# Patient Record
Sex: MALE | Race: WHITE | NOT HISPANIC OR LATINO | URBAN - METROPOLITAN AREA
[De-identification: names, ages, dates, MRNs, and addresses within clinical notes are randomized per-mention and may not be internally consistent; named-entity substitution may affect disease eponyms.]

---

## 2021-03-13 ENCOUNTER — INPATIENT (INPATIENT)
Facility: HOSPITAL | Age: 71
LOS: 5 days | Discharge: HOME HEALTH | End: 2021-03-19
Attending: FAMILY MEDICINE | Admitting: FAMILY MEDICINE
Payer: MEDICARE

## 2021-03-13 ENCOUNTER — EMERGENCY (EMERGENCY)
Facility: HOSPITAL | Age: 71
LOS: 0 days | Discharge: HOME | End: 2021-03-13
Attending: EMERGENCY MEDICINE
Payer: MEDICARE

## 2021-03-13 VITALS
DIASTOLIC BLOOD PRESSURE: 88 MMHG | SYSTOLIC BLOOD PRESSURE: 168 MMHG | RESPIRATION RATE: 20 BRPM | OXYGEN SATURATION: 98 % | HEART RATE: 90 BPM | TEMPERATURE: 98 F

## 2021-03-13 VITALS — HEIGHT: 68 IN | WEIGHT: 210.1 LBS

## 2021-03-13 VITALS
HEART RATE: 108 BPM | HEIGHT: 68 IN | SYSTOLIC BLOOD PRESSURE: 139 MMHG | OXYGEN SATURATION: 99 % | WEIGHT: 210.1 LBS | DIASTOLIC BLOOD PRESSURE: 78 MMHG | TEMPERATURE: 99 F | RESPIRATION RATE: 18 BRPM

## 2021-03-13 DIAGNOSIS — R33.9 RETENTION OF URINE, UNSPECIFIED: ICD-10-CM

## 2021-03-13 DIAGNOSIS — F32.9 MAJOR DEPRESSIVE DISORDER, SINGLE EPISODE, UNSPECIFIED: ICD-10-CM

## 2021-03-13 DIAGNOSIS — I10 ESSENTIAL (PRIMARY) HYPERTENSION: ICD-10-CM

## 2021-03-13 DIAGNOSIS — F41.9 ANXIETY DISORDER, UNSPECIFIED: ICD-10-CM

## 2021-03-13 DIAGNOSIS — Z98.890 OTHER SPECIFIED POSTPROCEDURAL STATES: Chronic | ICD-10-CM

## 2021-03-13 DIAGNOSIS — H26.9 UNSPECIFIED CATARACT: Chronic | ICD-10-CM

## 2021-03-13 DIAGNOSIS — Z02.9 ENCOUNTER FOR ADMINISTRATIVE EXAMINATIONS, UNSPECIFIED: ICD-10-CM

## 2021-03-13 DIAGNOSIS — M10.9 GOUT, UNSPECIFIED: ICD-10-CM

## 2021-03-13 DIAGNOSIS — C67.9 MALIGNANT NEOPLASM OF BLADDER, UNSPECIFIED: ICD-10-CM

## 2021-03-13 LAB
ALBUMIN SERPL ELPH-MCNC: 3.9 G/DL — SIGNIFICANT CHANGE UP (ref 3.5–5.2)
ALP SERPL-CCNC: 79 U/L — SIGNIFICANT CHANGE UP (ref 30–115)
ALT FLD-CCNC: 17 U/L — SIGNIFICANT CHANGE UP (ref 0–41)
ANION GAP SERPL CALC-SCNC: 13 MMOL/L — SIGNIFICANT CHANGE UP (ref 7–14)
APPEARANCE UR: ABNORMAL
APTT BLD: 29.4 SEC — SIGNIFICANT CHANGE UP (ref 27–39.2)
AST SERPL-CCNC: 42 U/L — HIGH (ref 0–41)
BACTERIA # UR AUTO: ABNORMAL /HPF
BASOPHILS # BLD AUTO: 0.04 K/UL — SIGNIFICANT CHANGE UP (ref 0–0.2)
BASOPHILS NFR BLD AUTO: 0.2 % — SIGNIFICANT CHANGE UP (ref 0–1)
BILIRUB SERPL-MCNC: 0.7 MG/DL — SIGNIFICANT CHANGE UP (ref 0.2–1.2)
BILIRUB UR-MCNC: ABNORMAL
BLD GP AB SCN SERPL QL: SIGNIFICANT CHANGE UP
BUN SERPL-MCNC: 35 MG/DL — HIGH (ref 10–20)
CALCIUM SERPL-MCNC: 9 MG/DL — SIGNIFICANT CHANGE UP (ref 8.5–10.1)
CHLORIDE SERPL-SCNC: 95 MMOL/L — LOW (ref 98–110)
CO2 SERPL-SCNC: 23 MMOL/L — SIGNIFICANT CHANGE UP (ref 17–32)
COD CRY URNS QL: NEGATIVE — SIGNIFICANT CHANGE UP
COLOR SPEC: ABNORMAL
CREAT SERPL-MCNC: 1.6 MG/DL — HIGH (ref 0.7–1.5)
DIFF PNL FLD: ABNORMAL
EOSINOPHIL # BLD AUTO: 0 K/UL — SIGNIFICANT CHANGE UP (ref 0–0.7)
EOSINOPHIL NFR BLD AUTO: 0 % — SIGNIFICANT CHANGE UP (ref 0–8)
EPI CELLS # UR: NEGATIVE — SIGNIFICANT CHANGE UP
GLUCOSE SERPL-MCNC: 110 MG/DL — HIGH (ref 70–99)
GLUCOSE UR QL: 250
GRAN CASTS # UR COMP ASSIST: NEGATIVE — SIGNIFICANT CHANGE UP
HCT VFR BLD CALC: 28.7 % — LOW (ref 42–52)
HGB BLD-MCNC: 10 G/DL — LOW (ref 14–18)
HYALINE CASTS # UR AUTO: NEGATIVE — SIGNIFICANT CHANGE UP
IMM GRANULOCYTES NFR BLD AUTO: 0.8 % — HIGH (ref 0.1–0.3)
INR BLD: 1.22 RATIO — SIGNIFICANT CHANGE UP (ref 0.65–1.3)
KETONES UR-MCNC: 80 — SIGNIFICANT CHANGE UP
LEUKOCYTE ESTERASE UR-ACNC: ABNORMAL
LYMPHOCYTES # BLD AUTO: 1.19 K/UL — LOW (ref 1.2–3.4)
LYMPHOCYTES # BLD AUTO: 6.5 % — LOW (ref 20.5–51.1)
MCHC RBC-ENTMCNC: 33.2 PG — HIGH (ref 27–31)
MCHC RBC-ENTMCNC: 34.8 G/DL — SIGNIFICANT CHANGE UP (ref 32–37)
MCV RBC AUTO: 95.3 FL — HIGH (ref 80–94)
MONOCYTES # BLD AUTO: 1.15 K/UL — HIGH (ref 0.1–0.6)
MONOCYTES NFR BLD AUTO: 6.3 % — SIGNIFICANT CHANGE UP (ref 1.7–9.3)
NEUTROPHILS # BLD AUTO: 15.75 K/UL — HIGH (ref 1.4–6.5)
NEUTROPHILS NFR BLD AUTO: 86.2 % — HIGH (ref 42.2–75.2)
NITRITE UR-MCNC: POSITIVE
NRBC # BLD: 0 /100 WBCS — SIGNIFICANT CHANGE UP (ref 0–0)
PH UR: 8.5 — SIGNIFICANT CHANGE UP (ref 5–8)
PLATELET # BLD AUTO: 286 K/UL — SIGNIFICANT CHANGE UP (ref 130–400)
POTASSIUM SERPL-MCNC: 4.6 MMOL/L — SIGNIFICANT CHANGE UP (ref 3.5–5)
POTASSIUM SERPL-SCNC: 4.6 MMOL/L — SIGNIFICANT CHANGE UP (ref 3.5–5)
PROT SERPL-MCNC: 6.2 G/DL — SIGNIFICANT CHANGE UP (ref 6–8)
PROT UR-MCNC: >=300
PROTHROM AB SERPL-ACNC: 14 SEC — HIGH (ref 9.95–12.87)
RAPID RVP RESULT: SIGNIFICANT CHANGE UP
RBC # BLD: 3.01 M/UL — LOW (ref 4.7–6.1)
RBC # FLD: 12.9 % — SIGNIFICANT CHANGE UP (ref 11.5–14.5)
RBC CASTS # UR COMP ASSIST: >50 /HPF
SARS-COV-2 RNA SPEC QL NAA+PROBE: SIGNIFICANT CHANGE UP
SODIUM SERPL-SCNC: 131 MMOL/L — LOW (ref 135–146)
SP GR SPEC: <=1.005 — SIGNIFICANT CHANGE UP (ref 1.01–1.03)
TRI-PHOS CRY UR QL COMP ASSIST: NEGATIVE — SIGNIFICANT CHANGE UP
URATE CRY FLD QL MICRO: NEGATIVE — SIGNIFICANT CHANGE UP
UROBILINOGEN FLD QL: 4 (ref 0.2–0.2)
WBC # BLD: 18.28 K/UL — HIGH (ref 4.8–10.8)
WBC # FLD AUTO: 18.28 K/UL — HIGH (ref 4.8–10.8)
WBC UR QL: ABNORMAL /HPF

## 2021-03-13 PROCEDURE — 99285 EMERGENCY DEPT VISIT HI MDM: CPT | Mod: CS

## 2021-03-13 PROCEDURE — 99283 EMERGENCY DEPT VISIT LOW MDM: CPT

## 2021-03-13 PROCEDURE — 99233 SBSQ HOSP IP/OBS HIGH 50: CPT

## 2021-03-13 PROCEDURE — 99221 1ST HOSP IP/OBS SF/LOW 40: CPT

## 2021-03-13 RX ORDER — BRIMONIDINE TARTRATE 2 MG/MG
1 SOLUTION/ DROPS OPHTHALMIC
Qty: 0 | Refills: 0 | DISCHARGE

## 2021-03-13 RX ORDER — ALPRAZOLAM 0.25 MG
1 TABLET ORAL AT BEDTIME
Refills: 0 | Status: DISCONTINUED | OUTPATIENT
Start: 2021-03-13 | End: 2021-03-13

## 2021-03-13 RX ORDER — CYCLOBENZAPRINE HYDROCHLORIDE 10 MG/1
10 TABLET, FILM COATED ORAL
Qty: 0 | Refills: 0 | DISCHARGE

## 2021-03-13 RX ORDER — LOSARTAN POTASSIUM 100 MG/1
100 TABLET, FILM COATED ORAL DAILY
Refills: 0 | Status: DISCONTINUED | OUTPATIENT
Start: 2021-03-13 | End: 2021-03-19

## 2021-03-13 RX ORDER — ALPRAZOLAM 0.25 MG
1 TABLET ORAL THREE TIMES A DAY
Refills: 0 | Status: DISCONTINUED | OUTPATIENT
Start: 2021-03-13 | End: 2021-03-14

## 2021-03-13 RX ORDER — BRIMONIDINE TARTRATE 2 MG/MG
1 SOLUTION/ DROPS OPHTHALMIC THREE TIMES A DAY
Refills: 0 | Status: DISCONTINUED | OUTPATIENT
Start: 2021-03-13 | End: 2021-03-19

## 2021-03-13 RX ORDER — CYCLOBENZAPRINE HYDROCHLORIDE 10 MG/1
10 TABLET, FILM COATED ORAL AT BEDTIME
Refills: 0 | Status: DISCONTINUED | OUTPATIENT
Start: 2021-03-13 | End: 2021-03-19

## 2021-03-13 RX ORDER — ONDANSETRON 8 MG/1
4 TABLET, FILM COATED ORAL EVERY 6 HOURS
Refills: 0 | Status: DISCONTINUED | OUTPATIENT
Start: 2021-03-13 | End: 2021-03-19

## 2021-03-13 RX ORDER — HYDROXYZINE HCL 10 MG
50 TABLET ORAL ONCE
Refills: 0 | Status: COMPLETED | OUTPATIENT
Start: 2021-03-13 | End: 2021-03-13

## 2021-03-13 RX ORDER — LATANOPROST 0.05 MG/ML
1 SOLUTION/ DROPS OPHTHALMIC; TOPICAL AT BEDTIME
Refills: 0 | Status: DISCONTINUED | OUTPATIENT
Start: 2021-03-13 | End: 2021-03-19

## 2021-03-13 RX ORDER — ALPRAZOLAM 0.25 MG
1 TABLET ORAL
Qty: 0 | Refills: 0 | DISCHARGE

## 2021-03-13 RX ORDER — CEFTRIAXONE 500 MG/1
1000 INJECTION, POWDER, FOR SOLUTION INTRAMUSCULAR; INTRAVENOUS ONCE
Refills: 0 | Status: COMPLETED | OUTPATIENT
Start: 2021-03-13 | End: 2021-03-13

## 2021-03-13 RX ORDER — LATANOPROST 0.05 MG/ML
1 SOLUTION/ DROPS OPHTHALMIC; TOPICAL
Qty: 0 | Refills: 0 | DISCHARGE

## 2021-03-13 RX ORDER — MORPHINE SULFATE 50 MG/1
4 CAPSULE, EXTENDED RELEASE ORAL ONCE
Refills: 0 | Status: DISCONTINUED | OUTPATIENT
Start: 2021-03-13 | End: 2021-03-13

## 2021-03-13 RX ORDER — FLUOXETINE HCL 10 MG
1 CAPSULE ORAL
Qty: 0 | Refills: 0 | DISCHARGE

## 2021-03-13 RX ORDER — AMLODIPINE BESYLATE 2.5 MG/1
1 TABLET ORAL
Qty: 0 | Refills: 0 | DISCHARGE

## 2021-03-13 RX ORDER — CEFTRIAXONE 500 MG/1
1000 INJECTION, POWDER, FOR SOLUTION INTRAMUSCULAR; INTRAVENOUS EVERY 24 HOURS
Refills: 0 | Status: COMPLETED | OUTPATIENT
Start: 2021-03-13 | End: 2021-03-16

## 2021-03-13 RX ORDER — SODIUM CHLORIDE 9 MG/ML
1000 INJECTION INTRAMUSCULAR; INTRAVENOUS; SUBCUTANEOUS
Refills: 0 | Status: DISCONTINUED | OUTPATIENT
Start: 2021-03-13 | End: 2021-03-19

## 2021-03-13 RX ORDER — AMLODIPINE BESYLATE 2.5 MG/1
10 TABLET ORAL DAILY
Refills: 0 | Status: DISCONTINUED | OUTPATIENT
Start: 2021-03-13 | End: 2021-03-19

## 2021-03-13 RX ORDER — LOSARTAN POTASSIUM 100 MG/1
1 TABLET, FILM COATED ORAL
Qty: 0 | Refills: 0 | DISCHARGE

## 2021-03-13 RX ORDER — CHLORHEXIDINE GLUCONATE 213 G/1000ML
1 SOLUTION TOPICAL
Refills: 0 | Status: DISCONTINUED | OUTPATIENT
Start: 2021-03-13 | End: 2021-03-19

## 2021-03-13 RX ORDER — SENNA PLUS 8.6 MG/1
2 TABLET ORAL AT BEDTIME
Refills: 0 | Status: DISCONTINUED | OUTPATIENT
Start: 2021-03-13 | End: 2021-03-19

## 2021-03-13 RX ORDER — ALLOPURINOL 300 MG
100 TABLET ORAL DAILY
Refills: 0 | Status: DISCONTINUED | OUTPATIENT
Start: 2021-03-13 | End: 2021-03-19

## 2021-03-13 RX ORDER — ALLOPURINOL 300 MG
1 TABLET ORAL
Qty: 0 | Refills: 0 | DISCHARGE

## 2021-03-13 RX ORDER — FLUOXETINE HCL 10 MG
20 CAPSULE ORAL DAILY
Refills: 0 | Status: DISCONTINUED | OUTPATIENT
Start: 2021-03-13 | End: 2021-03-19

## 2021-03-13 RX ADMIN — CEFTRIAXONE 100 MILLIGRAM(S): 500 INJECTION, POWDER, FOR SOLUTION INTRAMUSCULAR; INTRAVENOUS at 14:53

## 2021-03-13 RX ADMIN — MORPHINE SULFATE 4 MILLIGRAM(S): 50 CAPSULE, EXTENDED RELEASE ORAL at 14:54

## 2021-03-13 RX ADMIN — Medication 1 MILLIGRAM(S): at 22:13

## 2021-03-13 RX ADMIN — MORPHINE SULFATE 4 MILLIGRAM(S): 50 CAPSULE, EXTENDED RELEASE ORAL at 14:37

## 2021-03-13 RX ADMIN — SODIUM CHLORIDE 100 MILLILITER(S): 9 INJECTION INTRAMUSCULAR; INTRAVENOUS; SUBCUTANEOUS at 22:17

## 2021-03-13 RX ADMIN — Medication 0.1 MILLIGRAM(S): at 17:59

## 2021-03-13 NOTE — H&P ADULT - NSICDXPASTMEDICALHX_GEN_ALL_CORE_FT
PAST MEDICAL HISTORY:  Anxiety     Benign essential HTN     Bladder cancer     Clot retention of urine     Depression     Small catheter in place     Gout     Hematuria secondary to bladder mass

## 2021-03-13 NOTE — H&P ADULT - DOES THIS PATIENT HAVE A HISTORY OF OR HAS BEEN DX WITH HEART FAILURE?
Bedside shift change report given to Collins Fajardo 251 nurse) by Mandi Alford RN (offgoing nurse).  Report included the following information SBAR and Kardex.    no

## 2021-03-13 NOTE — ED PROVIDER NOTE - PROGRESS NOTE DETAILS
brannon irrigated with 300 cc ns.  total UO was about 1500cc.  urine clearing to light red/pink.   pt has appt with  monday. understands to return if needed.

## 2021-03-13 NOTE — H&P ADULT - NSHPLABSRESULTS_GEN_ALL_CORE
.                        10.0   18.28 )-----------( 286      ( 13 Mar 2021 12:07 )             28.7       03-13    131<L>  |  95<L>  |  35<H>  ----------------------------<  110<H>  4.6   |  23  |  1.6<H>    Ca    9.0      13 Mar 2021 12:07    TPro  6.2  /  Alb  3.9  /  TBili  0.7  /  DBili  x   /  AST  42<H>  /  ALT  17  /  AlkPhos  79  03-13              Urinalysis Basic - ( 13 Mar 2021 15:20 )    Color: Red / Appearance: Cloudy / SG: <=1.005 / pH: x  Gluc: x / Ketone: 80  / Bili: Large / Urobili: 4.0   Blood: x / Protein: >=300 / Nitrite: Positive   Leuk Esterase: Large / RBC: >50 /HPF / WBC 10-25 /HPF   Sq Epi: x / Non Sq Epi: Negative / Bacteria: TNTC /HPF        PT/INR - ( 13 Mar 2021 12:07 )   PT: 14.00 sec;   INR: 1.22 ratio    PTT - ( 13 Mar 2021 12:07 )  PTT:29.4 sec

## 2021-03-13 NOTE — ED PROVIDER NOTE - PHYSICAL EXAMINATION
VITAL SIGNS: I have reviewed nursing notes and confirm.  CONSTITUTIONAL: Well-developed; well-nourished; in no acute distress.  SKIN: Skin exam is warm and dry, no acute rash.  HEAD: Normocephalic; atraumatic.  EYES: PERRL, EOM intact; conjunctiva and sclera clear.  ENT: No nasal discharge; airway clear.   NECK: Supple; non tender.  CARD:+ S1, S2   RESP: No wheezes, rales or rhonchi.  ABD: Normal bowel sounds; soft; non-distended; non-tender;  : penis uncirc, brannon in place. no drainage into leg bag. testicles unremarkable.   EXT: Normal ROM. No cyanosis or edema.  LYMPH: No acute adenopathy.  NEURO: Alert. Grossly unremarkable. No focal deficits.  PSYCH: Cooperative, appropriate.

## 2021-03-13 NOTE — H&P ADULT - PROBLEM SELECTOR PLAN 1
.  - Urology consult completed in ER today.  Urology to follow.  - Small catheter and irrigate every 6 hours and as needed for clot retention.  - Monitor I&O's.  - Possible UTI:  WBC=18 today --> Follow U/A and urine cultures, F/U WBC count.  Start Ivabx -- Rocephin 1 gram IV q 24 hours. (Pt was on oral Cipro at home after his procedure).   - Monitor labs / Trend Hbg/Hct.  - HYACINTH : Creatinine 1.6 --> most likely from clot retention.  Monitor repeat Creatinine and continue IV hydration.   - VTE prophylaxis:  Mechanical and ambulation / exercises.  No chemical prophylaxis due to active hematuria.   - Case d/w Dr. Lloyd. .  - Urology consult completed in ER today.  Urology to follow.  - Small catheter and irrigate every 6 hours and as needed for clot retention.  - Monitor I&O's.  - Possible UTI:  WBC=18 today --> Follow U/A and urine cultures, F/U WBC count.  Start Ivabx -- Rocephin 1 gram IV q 24 hours. (Pt was on oral Cipro at home after his procedure).   - Monitor labs / Trend Hbg/Hct.  - HYACINTH : Creatinine 1.6 --> most likely from clot retention.  Monitor repeat Creatinine and continue IV hydration.   - VTE prophylaxis:  Mechanical and ambulation / exercises.  No chemical prophylaxis due to active hematuria.

## 2021-03-13 NOTE — ED PROVIDER NOTE - PHYSICAL EXAMINATION
CONST: Well appearing in NAD  EYES: Sclera and conjunctiva clear.  CARD: Normal S1 S2; Normal rate and rhythm  RESP: Equal BS B/L, No wheezes, rhonchi or rales. No distress  GI: Soft, non-tender, non-distended.  : + red urine w/ clots in leg bag, mild TTP suprapubic region, no CVA tenderness,  MS: Normal ROM in all extremities. No edema of lower extremities, no calf pain, radial pulses 2+ bilaterally  SKIN: Warm, dry, no acute rashes. Good turgor  NEURO: A&Ox3, No focal deficits. Strength 5/5 with no sensory deficits

## 2021-03-13 NOTE — ED PROVIDER NOTE - NS ED ROS FT
CONSTITUTIONAL: Negatve   SKIN: Negatve   HEAD: Negatve   EYES: Negatve   ENT: Negatve   NECK: Negatve   CARD:Negatve   RESP:Negatve   ABD/ see hpi   EXT: Negatve  LYMPH: Negatve   NEURO: Negatve   PSYCH: Negatve

## 2021-03-13 NOTE — ED PROVIDER NOTE - PROGRESS NOTE DETAILS
discussed case w/ urology.  admit.  given ceftriaxone.  consult as needed for irrigation. Discussed case w/ Dr. Lloyd.  aware of admission.

## 2021-03-13 NOTE — ED PROVIDER NOTE - OBJECTIVE STATEMENT
Kaitlin Castellanos  1945    Specialist or PCP: Denita Orosco, NP  Symptoms: sciatic  Pain that has developed, was given diclofenac (VOLTAREN) 75 MG EC tablet for a knee issue before, wonders if she can use this for the sciatic issue too ?    Call Back #: [phone] (mobile)     70 y.o male w/ hx of HTN, bladder mass presents to the ED for evaluation of urinary retention.  hematuria x 5 days, saw Dr. Carl 2 days ago, had procedure to cauterize bleeding and 20 F fenestrated brannon placed.  Since then constant red urine w/ clots.  last night no urinary output, seen in ED, brannon irrigated w/ resolution of retention. Since this morning no additional output prompting visit to the ED.  Admits to mild suprapubic discomfort, achy, constant, no alleviating factors.  NO fever, chills, flank pain, abd pain, back pain, chest pain, dyspena.  No further complaints.  has appointment w/ Dr. Lopez on monday. no anticoagulation or antiplatelets. 70 y.o male w/ hx of HTN, bladder mass presents to the ED for evaluation of urinary retention.  hematuria x 5 days, saw Dr. Carl 2 days ago, had procedure to cauterize bleeding and 20 F fenestrated brannon placed.  Since then constant red urine w/ clots.  last night no urinary output, seen in ED, brannon irrigated w/ resolution of retention. Since this morning no additional output prompting visit to the ED.  Admits to mild suprapubic discomfort, achy, constant, no alleviating factors.  NO fever, chills, flank pain, abd pain, back pain, chest pain, dyspnea.  No further complaints.  has appointment w/ Dr. Lopez on monday. no anticoagulation or antiplatelets.

## 2021-03-13 NOTE — ED ADULT NURSE NOTE - NSIMPLEMENTINTERV_GEN_ALL_ED
Implemented All Fall with Harm Risk Interventions:  [place] to call system. Call bell, personal items and telephone within reach. Instruct patient to call for assistance. Room bathroom lighting operational. Non-slip footwear when patient is off stretcher. Physically safe environment: no spills, clutter or unnecessary equipment. Stretcher in lowest position, wheels locked, appropriate side rails in place. Provide visual cue, wrist band, yellow gown, etc. Monitor gait and stability. Monitor for mental status changes and reorient to person, place, and time. Review medications for side effects contributing to fall risk. Reinforce activity limits and safety measures with patient and family. Provide visual clues: red socks.

## 2021-03-13 NOTE — ED PROVIDER NOTE - NSFOLLOWUPINSTRUCTIONS_ED_ALL_ED_FT
Keep hydrated, follow up with your urologist Monday as scheduled.  Return for any worsening symptoms.

## 2021-03-13 NOTE — ED PROVIDER NOTE - ATTENDING CONTRIBUTION TO CARE
pt with blocked brannon/hematuria, PE as above, labs and studies reviewed and d/w CRISTOPHER, sarah paul for abx, possible cbi, stable for floor

## 2021-03-13 NOTE — H&P ADULT - NSHPPHYSICALEXAM_GEN_ALL_CORE
PHYSICAL EXAM:    General:  Wd, Wn male, conversant in NAD.    Eyes:  PERRLA, EOM intact.    Neck:  no tenderness, no JVD.    Respiratory:  CTA B/L, no W/R/R.    Cardiovascular:  S1 & S2, RRR.  No M/R/G.    Gastrointestinal:  + BS, soft, no distention, non-tender, no rebound or guarding or peritoneal signs.    Genitourinary:  Uncircumcised, normal external exam.  Urethral Small catheter in place, draining dark hematuria with some clots.  No suprapubic tenderness.  No CVAT.     Extremities:  Free painless ROM, no C/C/E, no calf tenderness.     Skin:  Warm, dry, no erythema or pressure ulcers.    Neurological: No focal deficits.

## 2021-03-13 NOTE — ED ADULT NURSE NOTE - NSIMPLEMENTINTERV_GEN_ALL_ED
Implemented All Fall Risk Interventions:  [place] to call system. Call bell, personal items and telephone within reach. Instruct patient to call for assistance. Room bathroom lighting operational. Non-slip footwear when patient is off stretcher. Physically safe environment: no spills, clutter or unnecessary equipment. Stretcher in lowest position, wheels locked, appropriate side rails in place. Provide visual cue, wrist band, yellow gown, etc. Monitor gait and stability. Monitor for mental status changes and reorient to person, place, and time. Review medications for side effects contributing to fall risk. Reinforce activity limits and safety measures with patient and family.

## 2021-03-13 NOTE — CONSULT NOTE ADULT - SUBJECTIVE AND OBJECTIVE BOX
HPI:  Pt is a 71yo M with c/o gross hematuria for 5 days and underwent cystoscopy and fulguration 2 days ago by Dr Carl and was found to have a large bladder mass. A 20 [place] brannon was placed with the end cut and pt was referred to Dr Lopez. Pt presents to ED today in clot retention, brannon irrigated by ED with clots removed but having some resistance/ difficulty aspirating. Pt reported some improvement after irrigation but very uncomfortable upon interview. Patient denies associated fevers, nausea, vomiting.       PAST MEDICAL & SURGICAL HISTORY:  Bladder cancer    Allergies    No Known Allergies      MEDICATIONS AT HOME:  XANAX, AMLODIPINE, PROZAC, GABAPENTIN, LOSARTAN        ROS;  General:	no fever, weight loss,  chills  Skin: no rash, ulcers  Ophthalmologic: no visual changes  ENMT:	no sore throat  Respiratory and Thorax: no cough, wheeze,  sob  Cardiovascular:	no chest pain, palpitations, dizziness  Gastrointestinal:	no nausea, vomiting, diarrhea, +abd pain  Genitourinary:	no dysuria, + hematuria  Musculoskeletal:	no joint pains  Neurological:	 no speech disturbance, focal weakness, numbness  Psychiatric:	no depression, anxiety, psychosis  Hematology/Lymphatics:	no anemia  Endocrine:	no polyuria, polydipsia        Vital Signs Last 24 Hrs  T(F): 98.7 (13 Mar 2021 11:46), Max: 98.7 (13 Mar 2021 11:46)  HR: 108 (13 Mar 2021 11:46) (90 - 108)  BP: 139/78 (13 Mar 2021 11:46) (139/78 - 179/86)  RR: 18 (13 Mar 2021 11:46) (18 - 20)  SpO2: 99% (13 Mar 2021 11:46) (98% - 99%)    PHYSICAL EXAM:      Constitutional: A&Ox4  Respiratory: cta b/l  Cardiovascular: s1 s2 rrr  Gastrointestinal: soft nt  nd + bs no rebound or guarding  Genitourinary: no cva tenderness, BRANNON IN PLACE, +RED URINE DRAINING  Extremities: normal rom, no edema, calf tenderness  Neurological:no focal deficits  Skin: no rash

## 2021-03-13 NOTE — H&P ADULT - PROBLEM SELECTOR PLAN 3
.  - Continue DASH diet.  - Monitor BP/Vital signs.  - Continue meds from home. (Amlodipine 10 mg daily, Losartan 100 mg daily and Clonidine 0.1 mg BID).  - Follow up with PCP after discharge for further management.

## 2021-03-13 NOTE — ED PROVIDER NOTE - OBJECTIVE STATEMENT
69 yo  m hx bladder ca brannon in place c/o urinary retention. saw Dr Carl yest in office, had brannon placed, has hematuria recently.  not on antiplates or anticoagulants. no fever, chills, ab pain, back pain, n, v, d. bag emptied 4 hours ago and no output since. has appt with Dr Mayers monday for eval of bladder mass.

## 2021-03-13 NOTE — ED PROVIDER NOTE - CARE PLAN
Principal Discharge DX:	Small catheter problem, initial encounter  Secondary Diagnosis:	Bladder cancer

## 2021-03-13 NOTE — H&P ADULT - PROBLEM SELECTOR PLAN 2
.  - Urology consult completed in ER today.  Urology to follow.  - Small catheter and irrigate every 6 hours and as needed for clot retention.  - Monitor labs / Trend Hbg/Hct.  - Patient to follow up with Dr. Lopez as scheduled.

## 2021-03-13 NOTE — H&P ADULT - HISTORY OF PRESENT ILLNESS
Patient is a 70 year old male with medical history of hypertension, Gout, Glaucoma, Depression with  Patient is a 70 year old male with medical history of hypertension, Gout, Glaucoma, Depression, Anxiety and recently diagnosed bladder mass on Cystoscopy who presents with persistent hematuria and urinary retention since last night.   Patient and his son who was at bedside (at patient's request) report had a Cystoscopy by Dr. Carl on 3/11/2021 and diagnosed with a bladder mass.  Patient was referred to Dr. Lopez for evaluation and further treatment.  Patient has his initial appointment with Dr. Lopez scheduled for Monday 3/15/2021.  Patient was discharged from the hospital after his Cystoscopy with a Small catheter but they state he has had hematuria since his cystoscopy and last night it was not functioning due to large amounts of clots.  Patient came to ER at [facility] overnight due to retention and reports was flushed and began working and was discharged from the ER.    Patient began to have increased suprapubic pain and pressure which became severe so returned to the ER for treatment.    Dr. Smith (who is covering for Dr. Carl) evaluated the patient in the ER earlier and deflated Small and advanced it and the Small began to work again.  Small was then irrigated by Dr. Smith and some clots were irrigated.    Dr. Smith recommends to continue with Small catheter with irrigations as needed, Ivabx and Urology to follow while in hospital.   Patient otherwise feels well and denies subjective fevers, chills, tremors, N/V/D, weakness, dizziness, CP or SOB.      Patient is a 70 year old male with medical history of hypertension, Gout, Glaucoma, Depression, Anxiety and recently diagnosed bladder mass on Cystoscopy who presents with persistent hematuria and urinary retention since last night.     Patient and his son who was at bedside (at patient's request) report had a Cystoscopy with Fulguration due to hematuria x 5 days by Dr. Carl on 3/11/2021 and diagnosed with a bladder mass.  Patient was referred to Dr. Lopez for evaluation and further treatment.  Patient has his initial appointment with Dr. Lopez scheduled for Monday 3/15/2021.    Patient was discharged from the hospital after his Cystoscopy with a Small catheter but they state he has had hematuria since his cystoscopy and last night it was not functioning due to large amounts of clots.  Patient came to ER at [facility] overnight due to retention and reports was flushed and began working and was discharged from the ER.      Patient began to have return of increased suprapubic pain and pressure and noticed Small was not draining again which became severe so returned to the ER for treatment.    Dr. Smith (who is covering for Dr. Carl) evaluated the patient in the ER earlier and deflated Small and advanced it and the Small began to work again.  Small was then irrigated by Dr. Smith and some clots were irrigated.    Dr. Smith recommends to continue with Small catheter with irrigations as needed, Ivabx and Urology to follow while in hospital.   Patient otherwise feels well and denies subjective fevers, chills, tremors, N/V/D, weakness, dizziness, CP or SOB.

## 2021-03-13 NOTE — CONSULT NOTE ADULT - ASSESSMENT
Pt is a 71yo M with c/o gross hematuria for 5 days and underwent cystoscopy and fulguration 2 days ago by Dr Carl and was found to have a large bladder mass. A 20 [place] brannon was placed with the end cut and pt was referred to Dr Lopez. Pt presents to ED today in clot retention, brannon irrigated by ED with clots removed but having some resistance/ difficulty aspirating. Pt reported some improvement after irrigation but very uncomfortable upon interview. Patient denies associated fevers, nausea, vomiting.   The catheter balloon was deflated by Dr Smith then the catheter was advanced. 400ml dark red fluid returned. Pt reported immediate improvement in his symptoms. The bladder was then irrigated with 500ml ml sterile water and some clots removed. Pt tolerated the procedure well and there where no complications. Brannon connected to drainage bag.  -admit to medical service  -f/u labs  -trend H&H  -iv abx  -irrigate brannon prn  -will follow on case  -d/w DR Smith

## 2021-03-13 NOTE — ED PROVIDER NOTE - PATIENT PORTAL LINK FT
You can access the FollowMyHealth Patient Portal offered by [facility] by registering at the following website: [email or web address]. By joining [facility]’s FollowMyHealth portal, you will also be able to view your health information using other applications (apps) compatible with our system.

## 2021-03-13 NOTE — ED ADULT TRIAGE NOTE - CHIEF COMPLAINT QUOTE
patient states he was here 4 am for pressure in pubic region, states, "The urine is not flowing" states catheter has blood clotting, being treated by MD Carl. states catheter was irrigated and relieved urinary flow, but area is obstructed again

## 2021-03-14 LAB
ANION GAP SERPL CALC-SCNC: 16 MMOL/L — HIGH (ref 7–14)
APTT BLD: 27.1 SEC — SIGNIFICANT CHANGE UP (ref 27–39.2)
BASOPHILS # BLD AUTO: 0.05 K/UL — SIGNIFICANT CHANGE UP (ref 0–0.2)
BASOPHILS NFR BLD AUTO: 0.3 % — SIGNIFICANT CHANGE UP (ref 0–1)
BLD GP AB SCN SERPL QL: SIGNIFICANT CHANGE UP
BUN SERPL-MCNC: 41 MG/DL — HIGH (ref 10–20)
CALCIUM SERPL-MCNC: 8.9 MG/DL — SIGNIFICANT CHANGE UP (ref 8.5–10.1)
CHLORIDE SERPL-SCNC: 97 MMOL/L — LOW (ref 98–110)
CO2 SERPL-SCNC: 22 MMOL/L — SIGNIFICANT CHANGE UP (ref 17–32)
CREAT SERPL-MCNC: 1.9 MG/DL — HIGH (ref 0.7–1.5)
CULTURE RESULTS: NO GROWTH — SIGNIFICANT CHANGE UP
EOSINOPHIL # BLD AUTO: 0.01 K/UL — SIGNIFICANT CHANGE UP (ref 0–0.7)
EOSINOPHIL NFR BLD AUTO: 0.1 % — SIGNIFICANT CHANGE UP (ref 0–8)
GLUCOSE SERPL-MCNC: 137 MG/DL — HIGH (ref 70–99)
HCT VFR BLD CALC: 27 % — LOW (ref 42–52)
HGB BLD-MCNC: 9.1 G/DL — LOW (ref 14–18)
IMM GRANULOCYTES NFR BLD AUTO: 0.7 % — HIGH (ref 0.1–0.3)
INR BLD: 1.17 RATIO — SIGNIFICANT CHANGE UP (ref 0.65–1.3)
LYMPHOCYTES # BLD AUTO: 1.66 K/UL — SIGNIFICANT CHANGE UP (ref 1.2–3.4)
LYMPHOCYTES # BLD AUTO: 8.8 % — LOW (ref 20.5–51.1)
MCHC RBC-ENTMCNC: 32.7 PG — HIGH (ref 27–31)
MCHC RBC-ENTMCNC: 33.7 G/DL — SIGNIFICANT CHANGE UP (ref 32–37)
MCV RBC AUTO: 97.1 FL — HIGH (ref 80–94)
MONOCYTES # BLD AUTO: 1.3 K/UL — HIGH (ref 0.1–0.6)
MONOCYTES NFR BLD AUTO: 6.9 % — SIGNIFICANT CHANGE UP (ref 1.7–9.3)
NEUTROPHILS # BLD AUTO: 15.81 K/UL — HIGH (ref 1.4–6.5)
NEUTROPHILS NFR BLD AUTO: 83.2 % — HIGH (ref 42.2–75.2)
NRBC # BLD: 0 /100 WBCS — SIGNIFICANT CHANGE UP (ref 0–0)
PLATELET # BLD AUTO: 399 K/UL — SIGNIFICANT CHANGE UP (ref 130–400)
POTASSIUM SERPL-MCNC: 4.3 MMOL/L — SIGNIFICANT CHANGE UP (ref 3.5–5)
POTASSIUM SERPL-SCNC: 4.3 MMOL/L — SIGNIFICANT CHANGE UP (ref 3.5–5)
PROTHROM AB SERPL-ACNC: 13.4 SEC — HIGH (ref 9.95–12.87)
RBC # BLD: 2.78 M/UL — LOW (ref 4.7–6.1)
RBC # FLD: 12.9 % — SIGNIFICANT CHANGE UP (ref 11.5–14.5)
SARS-COV-2 IGG SERPL QL IA: NEGATIVE — SIGNIFICANT CHANGE UP
SARS-COV-2 IGM SERPL IA-ACNC: 0.07 INDEX — SIGNIFICANT CHANGE UP
SODIUM SERPL-SCNC: 135 MMOL/L — SIGNIFICANT CHANGE UP (ref 135–146)
SPECIMEN SOURCE: SIGNIFICANT CHANGE UP
WBC # BLD: 18.96 K/UL — HIGH (ref 4.8–10.8)
WBC # FLD AUTO: 18.96 K/UL — HIGH (ref 4.8–10.8)

## 2021-03-14 PROCEDURE — 99233 SBSQ HOSP IP/OBS HIGH 50: CPT

## 2021-03-14 PROCEDURE — 99231 SBSQ HOSP IP/OBS SF/LOW 25: CPT

## 2021-03-14 RX ORDER — ALPRAZOLAM 0.25 MG
1 TABLET ORAL
Refills: 0 | Status: DISCONTINUED | OUTPATIENT
Start: 2021-03-14 | End: 2021-03-19

## 2021-03-14 RX ORDER — ALPRAZOLAM 0.25 MG
1 TABLET ORAL DAILY
Refills: 0 | Status: DISCONTINUED | OUTPATIENT
Start: 2021-03-14 | End: 2021-03-19

## 2021-03-14 RX ADMIN — BRIMONIDINE TARTRATE 1 DROP(S): 2 SOLUTION/ DROPS OPHTHALMIC at 21:39

## 2021-03-14 RX ADMIN — BRIMONIDINE TARTRATE 1 DROP(S): 2 SOLUTION/ DROPS OPHTHALMIC at 06:04

## 2021-03-14 RX ADMIN — Medication 100 MILLIGRAM(S): at 15:18

## 2021-03-14 RX ADMIN — Medication 20 MILLIGRAM(S): at 15:18

## 2021-03-14 RX ADMIN — AMLODIPINE BESYLATE 10 MILLIGRAM(S): 2.5 TABLET ORAL at 06:05

## 2021-03-14 RX ADMIN — Medication 0.1 MILLIGRAM(S): at 17:21

## 2021-03-14 RX ADMIN — Medication 0.1 MILLIGRAM(S): at 06:43

## 2021-03-14 RX ADMIN — Medication 50 MILLIGRAM(S): at 00:04

## 2021-03-14 RX ADMIN — BRIMONIDINE TARTRATE 1 DROP(S): 2 SOLUTION/ DROPS OPHTHALMIC at 15:18

## 2021-03-14 RX ADMIN — LOSARTAN POTASSIUM 100 MILLIGRAM(S): 100 TABLET, FILM COATED ORAL at 06:04

## 2021-03-14 RX ADMIN — BRIMONIDINE TARTRATE 1 DROP(S): 2 SOLUTION/ DROPS OPHTHALMIC at 00:05

## 2021-03-14 RX ADMIN — Medication 1 MILLIGRAM(S): at 21:40

## 2021-03-14 RX ADMIN — LATANOPROST 1 DROP(S): 0.05 SOLUTION/ DROPS OPHTHALMIC; TOPICAL at 21:39

## 2021-03-14 RX ADMIN — CHLORHEXIDINE GLUCONATE 1 APPLICATION(S): 213 SOLUTION TOPICAL at 06:05

## 2021-03-14 RX ADMIN — LATANOPROST 1 DROP(S): 0.05 SOLUTION/ DROPS OPHTHALMIC; TOPICAL at 00:06

## 2021-03-14 RX ADMIN — SODIUM CHLORIDE 100 MILLILITER(S): 9 INJECTION INTRAMUSCULAR; INTRAVENOUS; SUBCUTANEOUS at 06:05

## 2021-03-14 RX ADMIN — CEFTRIAXONE 100 MILLIGRAM(S): 500 INJECTION, POWDER, FOR SOLUTION INTRAMUSCULAR; INTRAVENOUS at 06:26

## 2021-03-14 NOTE — PROGRESS NOTE ADULT - SUBJECTIVE AND OBJECTIVE BOX
INTERVAL HPI/OVERNIGHT EVENTS:pt was well until this morning.  On rounds informed by staff that brannon is not draining.  pt uncomforatable.  staff relates he has been pulling on catheter    MEDICATIONS  (STANDING):  allopurinol 100 milliGRAM(s) Oral daily  amLODIPine   Tablet 10 milliGRAM(s) Oral daily  brimonidine 0.2% Ophthalmic Solution 1 Drop(s) Both EYES three times a day  cefTRIAXone   IVPB 1000 milliGRAM(s) IV Intermittent every 24 hours  chlorhexidine 4% Liquid 1 Application(s) Topical <User Schedule>  cloNIDine 0.1 milliGRAM(s) Oral two times a day  FLUoxetine 20 milliGRAM(s) Oral daily  latanoprost 0.005% Ophthalmic Solution 1 Drop(s) Both EYES at bedtime  losartan 100 milliGRAM(s) Oral daily  sodium chloride 0.9%. 1000 milliLiter(s) (100 mL/Hr) IV Continuous <Continuous>    MEDICATIONS  (PRN):  ALPRAZolam 1 milliGRAM(s) Oral three times a day PRN anxiety  aluminum hydroxide/magnesium hydroxide/simethicone Suspension 30 milliLiter(s) Oral every 4 hours PRN Dyspepsia  cyclobenzaprine 10 milliGRAM(s) Oral at bedtime PRN Muscle Spasm  ondansetron Injectable 4 milliGRAM(s) IV Push every 6 hours PRN Nausea  senna 2 Tablet(s) Oral at bedtime PRN Constipation      Allergies    No Known Allergies    Intolerances        Vital Signs Last 24 Hrs  T(C): 35.9 (14 Mar 2021 05:10), Max: 37.1 (13 Mar 2021 11:46)  T(F): 96.7 (14 Mar 2021 05:10), Max: 98.7 (13 Mar 2021 11:46)  HR: 105 (14 Mar 2021 05:10) (94 - 108)  BP: 179/79 (14 Mar 2021 05:10) (136/70 - 179/79)  BP(mean): --  RR: 18 (14 Mar 2021 05:10) (18 - 18)  SpO2: 98% (13 Mar 2021 17:56) (98% - 99%)     ON PE:  General: alert and awake  Abdomen:soft, nt  :  brannon obviously migrated into urethra.  balloon deflated, catheter advanced into bladder, balloon reinflated 10 cc. 400 cc of bloody urine returned.  irrigates easily and irrigated clear.  abd remains soft, nt  LABS:                        9.1    18.96 )-----------( 399      ( 14 Mar 2021 07:40 )             27.0     03-14    135  |  97<L>  |  41<H>  ----------------------------<  137<H>  4.3   |  22  |  1.9<H>    Ca    8.9      14 Mar 2021 07:40    TPro  6.2  /  Alb  3.9  /  TBili  0.7  /  DBili  x   /  AST  42<H>  /  ALT  17  /  AlkPhos  79  03-13    PT/INR - ( 14 Mar 2021 07:40 )   PT: 13.40 sec;   INR: 1.17 ratio         PTT - ( 14 Mar 2021 07:40 )  PTT:27.1 sec  Urinalysis Basic - ( 13 Mar 2021 15:20 )    Color: Red / Appearance: Cloudy / SG: <=1.005 / pH: x  Gluc: x / Ketone: 80  / Bili: Large / Urobili: 4.0   Blood: x / Protein: >=300 / Nitrite: Positive   Leuk Esterase: Large / RBC: >50 /HPF / WBC 10-25 /HPF   Sq Epi: x / Non Sq Epi: Negative / Bacteria: TNTC /HPF        RADIOLOGY & ADDITIONAL TESTS:      Impression: bladder cancer- pt pulled catheter into urethra again, and again successfully repositioned in bladder                    likely uti    Plan:   continue brannon care            pt again instructed not to touch the brannon            ivab            f/u H and H            no anticoagulants until urine remains clear            follow up culture

## 2021-03-14 NOTE — PROGRESS NOTE ADULT - SUBJECTIVE AND OBJECTIVE BOX
70 year old male with hematuria and clot retention from a newly diagnosed bladder mass.     Today:  Seen at bedside, states he is worried about his bladder issues.      REVIEW OF SYSTEMS:  + suprapubic pain      MEDICATIONS  (STANDING):  allopurinol 100 milliGRAM(s) Oral daily  amLODIPine   Tablet 10 milliGRAM(s) Oral daily  brimonidine 0.2% Ophthalmic Solution 1 Drop(s) Both EYES three times a day  cefTRIAXone   IVPB 1000 milliGRAM(s) IV Intermittent every 24 hours  chlorhexidine 4% Liquid 1 Application(s) Topical <User Schedule>  cloNIDine 0.1 milliGRAM(s) Oral two times a day  FLUoxetine 20 milliGRAM(s) Oral daily  latanoprost 0.005% Ophthalmic Solution 1 Drop(s) Both EYES at bedtime  losartan 100 milliGRAM(s) Oral daily  sodium chloride 0.9%. 1000 milliLiter(s) (100 mL/Hr) IV Continuous <Continuous>    MEDICATIONS  (PRN):  ALPRAZolam 1 milliGRAM(s) Oral three times a day PRN anxiety  aluminum hydroxide/magnesium hydroxide/simethicone Suspension 30 milliLiter(s) Oral every 4 hours PRN Dyspepsia  cyclobenzaprine 10 milliGRAM(s) Oral at bedtime PRN Muscle Spasm  ondansetron Injectable 4 milliGRAM(s) IV Push every 6 hours PRN Nausea  senna 2 Tablet(s) Oral at bedtime PRN Constipation      Allergies  No Known Allergies        FAMILY HISTORY:  FHx: hypertension  Mother    FH: type 2 diabetes  Mother        Vital Signs Last 24 Hrs  T(C): 35.9 (14 Mar 2021 05:10), Max: 36.9 (13 Mar 2021 19:00)  T(F): 96.7 (14 Mar 2021 05:10), Max: 98.4 (13 Mar 2021 19:00)  HR: 105 (14 Mar 2021 05:10) (94 - 105)  BP: 179/79 (14 Mar 2021 05:10) (136/70 - 179/79)  RR: 18 (14 Mar 2021 05:10) (18 - 18)  SpO2: 98% (13 Mar 2021 17:56) (98% - 98%)    PHYSICAL EXAM:  General:  Wd, Wn male, conversant in NAD.  Eyes:  PERRLA, EOM intact.  Neck:  no tenderness, no JVD.  Respiratory:  CTA B/L, no W/R/R.  Cardiovascular:  S1 & S2, RRR.  No M/R/G.  Gastrointestinal:  + BS, soft, no distention, non-tender, no rebound or guarding or peritoneal signs.  Genitourinary:  Uncircumcised, normal external exam.  Urethral Small catheter in place, draining dark hematuria with some clots.  No suprapubic tenderness.  No CVAT.   Extremities:  Free painless ROM, no C/C/E, no calf tenderness.   Skin:  Warm, dry, no erythema or pressure ulcers.  Neurological: No focal deficits.      LABS:                        9.1    18.96 )-----------( 399      ( 14 Mar 2021 07:40 )             27.0     03-14    135  |  97<L>  |  41<H>  ----------------------------<  137<H>  4.3   |  22  |  1.9<H>    Ca    8.9      14 Mar 2021 07:40    TPro  6.2  /  Alb  3.9  /  TBili  0.7  /  DBili  x   /  AST  42<H>  /  ALT  17  /  AlkPhos  79  03-13    PT/INR - ( 14 Mar 2021 07:40 )   PT: 13.40 sec;   INR: 1.17 ratio         PTT - ( 14 Mar 2021 07:40 )  PTT:27.1 sec  Urinalysis Basic - ( 13 Mar 2021 15:20 )    Color: Red / Appearance: Cloudy / SG: <=1.005 / pH: x  Gluc: x / Ketone: 80  / Bili: Large / Urobili: 4.0   Blood: x / Protein: >=300 / Nitrite: Positive   Leuk Esterase: Large / RBC: >50 /HPF / WBC 10-25 /HPF   Sq Epi: x / Non Sq Epi: Negative / Bacteria: TNTC /HPF

## 2021-03-14 NOTE — PROGRESS NOTE ADULT - ASSESSMENT
70 year old male with hematuria and clot retention from a newly diagnosed bladder mass.      Problem/Plan - 1:  ·  Problem: Urinary retention.  Plan: .  - Small catheter and irrigate every 6 hours and as needed for clot retention.  - Monitor I&O's.  - Continue Rocephin IVPB (UA consistent with UTI; patient was supposed to be on Cipro post-procedure but had issues getting medication and only just took first dose last night)  - Monitor labs / Trend Hbg/Hct.  - HYACINTH : Creatinine 1.6 -->1.9 most likely from clot retention.  Monitor repeat Creatinine and continue IV hydration.-Urology consult appreciated, continue Small care and follow urine Cx     Problem/Plan - 2:  ·  Problem: Malignant neoplasm of urinary bladder, unspecified site.  Plan: .  - Small catheter and irrigate every 6 hours and as needed for clot retention.  - Monitor labs / Trend Hbg/Hct.  - Patient to follow up with Dr. Lopez as scheduled.      Problem/Plan - 3:  ·  Problem: Benign essential HTN.  Plan: .  - Continue DASH diet.  - Monitor BP/Vital signs.  - Continue meds from home. (Amlodipine 10 mg daily, Losartan 100 mg daily and Clonidine 0.1 mg BID).     Problem/Plan - 4:  ·  Problem: Gout, unspecified cause, unspecified chronicity, unspecified site.  Plan: .  - Continue Allopurinol 100 mg daily and F/U with PCP MD after D/C.      Problem/Plan - 5:  ·  Problem: Anxiety.  Plan: .  - Continue Xanax 1 mg PO at bedtine PRN.  - F/U with psychiatrist after DC for further mgt.      Problem/Plan - 6:  Problem: Depression, unspecified depression type. Plan: .  - Continue Prozac 20 mg daily.  - F/U with psychiatrist after DC for further mgt.    - VTE prophylaxis:  Mechanical and ambulation / exercises.  No chemical prophylaxis due to active hematuria.

## 2021-03-15 ENCOUNTER — APPOINTMENT (OUTPATIENT)
Dept: UROLOGY | Facility: CLINIC | Age: 71
End: 2021-03-15

## 2021-03-15 PROBLEM — Z00.00 ENCOUNTER FOR PREVENTIVE HEALTH EXAMINATION: Status: ACTIVE | Noted: 2021-03-15

## 2021-03-15 LAB
ANION GAP SERPL CALC-SCNC: 13 MMOL/L — SIGNIFICANT CHANGE UP (ref 7–14)
BUN SERPL-MCNC: 35 MG/DL — HIGH (ref 10–20)
CALCIUM SERPL-MCNC: 8.7 MG/DL — SIGNIFICANT CHANGE UP (ref 8.5–10.1)
CHLORIDE SERPL-SCNC: 104 MMOL/L — SIGNIFICANT CHANGE UP (ref 98–110)
CO2 SERPL-SCNC: 23 MMOL/L — SIGNIFICANT CHANGE UP (ref 17–32)
CREAT SERPL-MCNC: 1.6 MG/DL — HIGH (ref 0.7–1.5)
GLUCOSE SERPL-MCNC: 98 MG/DL — SIGNIFICANT CHANGE UP (ref 70–99)
HCT VFR BLD CALC: 20.9 % — LOW (ref 42–52)
HCT VFR BLD CALC: 22.3 % — LOW (ref 42–52)
HCV AB S/CO SERPL IA: 0.03 COI — SIGNIFICANT CHANGE UP
HCV AB SERPL-IMP: SIGNIFICANT CHANGE UP
HGB BLD-MCNC: 7.1 G/DL — LOW (ref 14–18)
HGB BLD-MCNC: 7.4 G/DL — LOW (ref 14–18)
MCHC RBC-ENTMCNC: 32.7 PG — HIGH (ref 27–31)
MCHC RBC-ENTMCNC: 33.2 G/DL — SIGNIFICANT CHANGE UP (ref 32–37)
MCHC RBC-ENTMCNC: 33.3 PG — HIGH (ref 27–31)
MCHC RBC-ENTMCNC: 34 G/DL — SIGNIFICANT CHANGE UP (ref 32–37)
MCV RBC AUTO: 98.1 FL — HIGH (ref 80–94)
MCV RBC AUTO: 98.7 FL — HIGH (ref 80–94)
NRBC # BLD: 0 /100 WBCS — SIGNIFICANT CHANGE UP (ref 0–0)
NRBC # BLD: 0 /100 WBCS — SIGNIFICANT CHANGE UP (ref 0–0)
PLATELET # BLD AUTO: 282 K/UL — SIGNIFICANT CHANGE UP (ref 130–400)
PLATELET # BLD AUTO: 320 K/UL — SIGNIFICANT CHANGE UP (ref 130–400)
POTASSIUM SERPL-MCNC: 4.2 MMOL/L — SIGNIFICANT CHANGE UP (ref 3.5–5)
POTASSIUM SERPL-SCNC: 4.2 MMOL/L — SIGNIFICANT CHANGE UP (ref 3.5–5)
RBC # BLD: 2.13 M/UL — LOW (ref 4.7–6.1)
RBC # BLD: 2.26 M/UL — LOW (ref 4.7–6.1)
RBC # FLD: 13.3 % — SIGNIFICANT CHANGE UP (ref 11.5–14.5)
RBC # FLD: 13.4 % — SIGNIFICANT CHANGE UP (ref 11.5–14.5)
SODIUM SERPL-SCNC: 140 MMOL/L — SIGNIFICANT CHANGE UP (ref 135–146)
WBC # BLD: 12.52 K/UL — HIGH (ref 4.8–10.8)
WBC # BLD: 13.74 K/UL — HIGH (ref 4.8–10.8)
WBC # FLD AUTO: 12.52 K/UL — HIGH (ref 4.8–10.8)
WBC # FLD AUTO: 13.74 K/UL — HIGH (ref 4.8–10.8)

## 2021-03-15 PROCEDURE — 99233 SBSQ HOSP IP/OBS HIGH 50: CPT

## 2021-03-15 PROCEDURE — 74176 CT ABD & PELVIS W/O CONTRAST: CPT | Mod: 26

## 2021-03-15 PROCEDURE — 99231 SBSQ HOSP IP/OBS SF/LOW 25: CPT

## 2021-03-15 RX ADMIN — Medication 20 MILLIGRAM(S): at 11:00

## 2021-03-15 RX ADMIN — CHLORHEXIDINE GLUCONATE 1 APPLICATION(S): 213 SOLUTION TOPICAL at 06:21

## 2021-03-15 RX ADMIN — Medication 1 MILLIGRAM(S): at 11:00

## 2021-03-15 RX ADMIN — BRIMONIDINE TARTRATE 1 DROP(S): 2 SOLUTION/ DROPS OPHTHALMIC at 23:02

## 2021-03-15 RX ADMIN — Medication 0.1 MILLIGRAM(S): at 17:12

## 2021-03-15 RX ADMIN — CEFTRIAXONE 100 MILLIGRAM(S): 500 INJECTION, POWDER, FOR SOLUTION INTRAMUSCULAR; INTRAVENOUS at 06:23

## 2021-03-15 RX ADMIN — LATANOPROST 1 DROP(S): 0.05 SOLUTION/ DROPS OPHTHALMIC; TOPICAL at 23:03

## 2021-03-15 RX ADMIN — Medication 0.1 MILLIGRAM(S): at 06:24

## 2021-03-15 RX ADMIN — SODIUM CHLORIDE 100 MILLILITER(S): 9 INJECTION INTRAMUSCULAR; INTRAVENOUS; SUBCUTANEOUS at 06:26

## 2021-03-15 RX ADMIN — BRIMONIDINE TARTRATE 1 DROP(S): 2 SOLUTION/ DROPS OPHTHALMIC at 06:24

## 2021-03-15 RX ADMIN — Medication 1 MILLIGRAM(S): at 00:27

## 2021-03-15 RX ADMIN — Medication 100 MILLIGRAM(S): at 11:00

## 2021-03-15 RX ADMIN — AMLODIPINE BESYLATE 10 MILLIGRAM(S): 2.5 TABLET ORAL at 06:24

## 2021-03-15 RX ADMIN — LOSARTAN POTASSIUM 100 MILLIGRAM(S): 100 TABLET, FILM COATED ORAL at 06:23

## 2021-03-15 RX ADMIN — Medication 1 MILLIGRAM(S): at 07:50

## 2021-03-15 NOTE — PROGRESS NOTE ADULT - SUBJECTIVE AND OBJECTIVE BOX
70 year old male with hematuria and clot retention from a newly diagnosed bladder mass.    Today:  Seen at bedside, still worried about bleeding in his Small.  Small still draining tereza blood.        REVIEW OF SYSTEMS:  Anxiety, suprapubic pain      MEDICATIONS  (STANDING):  allopurinol 100 milliGRAM(s) Oral daily  ALPRAZolam 1 milliGRAM(s) Oral daily  amLODIPine   Tablet 10 milliGRAM(s) Oral daily  brimonidine 0.2% Ophthalmic Solution 1 Drop(s) Both EYES three times a day  cefTRIAXone   IVPB 1000 milliGRAM(s) IV Intermittent every 24 hours  chlorhexidine 4% Liquid 1 Application(s) Topical <User Schedule>  cloNIDine 0.1 milliGRAM(s) Oral two times a day  FLUoxetine 20 milliGRAM(s) Oral daily  latanoprost 0.005% Ophthalmic Solution 1 Drop(s) Both EYES at bedtime  losartan 100 milliGRAM(s) Oral daily  sodium chloride 0.9%. 1000 milliLiter(s) (100 mL/Hr) IV Continuous <Continuous>    MEDICATIONS  (PRN):  ALPRAZolam 1 milliGRAM(s) Oral two times a day PRN anxiety  aluminum hydroxide/magnesium hydroxide/simethicone Suspension 30 milliLiter(s) Oral every 4 hours PRN Dyspepsia  cyclobenzaprine 10 milliGRAM(s) Oral at bedtime PRN Muscle Spasm  ondansetron Injectable 4 milliGRAM(s) IV Push every 6 hours PRN Nausea  senna 2 Tablet(s) Oral at bedtime PRN Constipation      Allergies  No Known Allergies        FAMILY HISTORY:  FHx: hypertension  Mother    FH: type 2 diabetes  Mother        Vital Signs Last 24 Hrs  T(C): 35.9 (15 Mar 2021 05:10), Max: 36.8 (14 Mar 2021 13:44)  T(F): 96.7 (15 Mar 2021 05:10), Max: 98.3 (14 Mar 2021 13:44)  HR: 105 (15 Mar 2021 05:10) (87 - 105)  BP: 164/77 (15 Mar 2021 05:10) (109/55 - 164/77)  RR: 16 (15 Mar 2021 05:10) (16 - 16)    PHYSICAL EXAM:  General:  Wd, Wn male, conversant in NAD.  Eyes:  PERRLA, EOM intact.  Neck:  no tenderness, no JVD.  Respiratory:  CTA B/L, no W/R/R.  Cardiovascular:  S1 & S2, RRR.  No M/R/G.  Gastrointestinal:  + BS, soft, no distention, non-tender, no rebound or guarding or peritoneal signs.  Genitourinary:  Uncircumcised, normal external exam.  Urethral Small catheter in place, draining tereza blood with some clots. No CVAT.   Extremities:  Free painless ROM, no C/C/E, no calf tenderness.   Skin:  Warm, dry, no erythema or pressure ulcers.  Neurological: No focal deficits.      LABS:                        7.4    13.74 )-----------( 320      ( 15 Mar 2021 11:01 )             22.3     03-15    140  |  104  |  35<H>  ----------------------------<  98  4.2   |  23  |  1.6<H>    Ca    8.7      15 Mar 2021 11:01      PT/INR - ( 14 Mar 2021 07:40 )   PT: 13.40 sec;   INR: 1.17 ratio         PTT - ( 14 Mar 2021 07:40 )  PTT:27.1 sec  Urinalysis Basic - ( 13 Mar 2021 15:20 )    Color: Red / Appearance: Cloudy / SG: <=1.005 / pH: x  Gluc: x / Ketone: 80  / Bili: Large / Urobili: 4.0   Blood: x / Protein: >=300 / Nitrite: Positive   Leuk Esterase: Large / RBC: >50 /HPF / WBC 10-25 /HPF   Sq Epi: x / Non Sq Epi: Negative / Bacteria: TNTC /HPF

## 2021-03-15 NOTE — PROVIDER CONTACT NOTE (OTHER) - BACKGROUND
Pt 4 days s/p cystoscopy. Small irrigation ordered q6h & prn. Large amount of clots and blood continue to be flushed out. PA and MD asked by writer about possibility of CBI.

## 2021-03-15 NOTE — PROVIDER CONTACT NOTE (OTHER) - SITUATION
Unable to irrigate brannon, 100ml put in, unable to draw out any fluid, pt felt intense pressure in stomach likely clot obstruction. PA Dyllan assessed and replaced brannon, currently draining without issues

## 2021-03-15 NOTE — PROGRESS NOTE ADULT - ASSESSMENT
70 year old male with hematuria and clot retention from a newly diagnosed bladder mass.      Problem/Plan - 1:  ·  Problem: Hematuria withUrinary retention.  Plan: .  - Small catheter and irrigate every 6 hours and as needed for clot retention.  - Monitor I&O's.  - Continue Rocephin IVPB (UA consistent with UTI; patient was supposed to be on Cipro post-procedure but had issues getting medication and only just took first dose last night)  - Hgb 9.1-->7.4, follow up 3 pm CBC  - HYACINTH : Creatinine 1.6 -->1.9-->1.6 most likely from clot retention.  Monitor repeat Creatinine and continue IV hydration.-Urology consult appreciated, continue Small care and follow urine Cx     Problem/Plan - 2:  ·  Problem: Malignant neoplasm of urinary bladder, unspecified site.  Plan: .  - Small catheter and irrigate every 6 hours and as needed for clot retention.  - Monitor labs / Trend Hbg/Hct.  - Patient to follow up with Dr. Lopez as scheduled.      Problem/Plan - 3:  ·  Problem: Benign essential HTN.  Plan: .  - Continue DASH diet.  - Monitor BP/Vital signs.  - Continue meds from home. (Amlodipine 10 mg daily, Losartan 100 mg daily and Clonidine 0.1 mg BID).     Problem/Plan - 4:  ·  Problem: Gout, unspecified cause, unspecified chronicity, unspecified site.  Plan: .  - Continue Allopurinol 100 mg daily and F/U with PCP MD after D/C.      Problem/Plan - 5:  ·  Problem: Anxiety.  Plan: .  - F/U with psychiatrist after DC for further mgt.   -Discussed with son, patient is very anxious and takes at least 1 Xanax per day; might not ask for it here  -Will order 1 standing Xanax, then PRN BID     Problem/Plan - 6:  Problem: Depression, unspecified depression type. Plan: .  - Continue Prozac 20 mg daily.  - F/U with psychiatrist after DC for further mgt.    - VTE prophylaxis:  Mechanical and ambulation / exercises.  No chemical prophylaxis due to active hematuria.   70 year old male with hematuria and clot retention from a newly diagnosed bladder mass.      Problem/Plan - 1:  ·  Problem: Hematuria withUrinary retention.  Plan: .  - Small catheter and irrigate every 6 hours and as needed for clot retention.  - Monitor I&O's.  - Continue Rocephin IVPB (UA consistent with UTI; patient was supposed to be on Cipro post-procedure but had issues getting medication and only just took first dose prior to admission)  - Hgb 9.1-->7.4, follow up 3 pm CBC  - HYACINTH : Creatinine 1.6 -->1.9-->1.6 most likely from clot retention.  Monitor repeat Creatinine and continue IV hydration.-Urology consult appreciated, continue Small care and follow urine Cx     Problem/Plan - 2:  ·  Problem: Malignant neoplasm of urinary bladder, unspecified site.  Plan: .  - Small catheter and irrigate every 6 hours and as needed for clot retention.  - Monitor labs / Trend Hbg/Hct.  - Patient to follow up with Dr. Lopez as scheduled.      Problem/Plan - 3:  ·  Problem: Benign essential HTN.  Plan: .  - Continue DASH diet.  - Monitor BP/Vital signs.  - Continue meds from home. (Amlodipine 10 mg daily, Losartan 100 mg daily and Clonidine 0.1 mg BID).     Problem/Plan - 4:  ·  Problem: Gout, unspecified cause, unspecified chronicity, unspecified site.  Plan: .  - Continue Allopurinol 100 mg daily and F/U with PCP MD after D/C.      Problem/Plan - 5:  ·  Problem: Anxiety.  Plan: .  - F/U with psychiatrist after DC for further mgt.   -Discussed with son, patient is very anxious and takes at least 1 Xanax per day; might not ask for it here  -Will order 1 standing Xanax, then PRN BID     Problem/Plan - 6:  Problem: Depression, unspecified depression type. Plan: .  - Continue Prozac 20 mg daily.  - F/U with psychiatrist after DC for further mgt.    - VTE prophylaxis:  Mechanical and ambulation / exercises.  No chemical prophylaxis due to active hematuria.

## 2021-03-15 NOTE — PROGRESS NOTE ADULT - SUBJECTIVE AND OBJECTIVE BOX
INTERVAL HPI/OVERNIGHT EVENTS: Pt continues to have hematuria.  required one irrigation this am    MEDICATIONS  (STANDING):  allopurinol 100 milliGRAM(s) Oral daily  ALPRAZolam 1 milliGRAM(s) Oral daily  amLODIPine   Tablet 10 milliGRAM(s) Oral daily  brimonidine 0.2% Ophthalmic Solution 1 Drop(s) Both EYES three times a day  cefTRIAXone   IVPB 1000 milliGRAM(s) IV Intermittent every 24 hours  chlorhexidine 4% Liquid 1 Application(s) Topical <User Schedule>  cloNIDine 0.1 milliGRAM(s) Oral two times a day  FLUoxetine 20 milliGRAM(s) Oral daily  latanoprost 0.005% Ophthalmic Solution 1 Drop(s) Both EYES at bedtime  losartan 100 milliGRAM(s) Oral daily  sodium chloride 0.9%. 1000 milliLiter(s) (100 mL/Hr) IV Continuous <Continuous>    MEDICATIONS  (PRN):  ALPRAZolam 1 milliGRAM(s) Oral two times a day PRN anxiety  aluminum hydroxide/magnesium hydroxide/simethicone Suspension 30 milliLiter(s) Oral every 4 hours PRN Dyspepsia  cyclobenzaprine 10 milliGRAM(s) Oral at bedtime PRN Muscle Spasm  ondansetron Injectable 4 milliGRAM(s) IV Push every 6 hours PRN Nausea  senna 2 Tablet(s) Oral at bedtime PRN Constipation      Allergies    No Known Allergies        Vital Signs Last 24 Hrs  T(C): 35.9 (15 Mar 2021 05:10), Max: 36.8 (14 Mar 2021 13:44)  T(F): 96.7 (15 Mar 2021 05:10), Max: 98.3 (14 Mar 2021 13:44)  HR: 105 (15 Mar 2021 05:10) (87 - 105)  BP: 164/77 (15 Mar 2021 05:10) (109/55 - 164/77)  BP(mean): --  RR: 16 (15 Mar 2021 05:10) (16 - 16)  SpO2: --     ON PE:  General: NAD  head at  no resp distress  :brannon patent, moderately hematuric urine    LABS:                        9.1    18.96 )-----------( 399      ( 14 Mar 2021 07:40 )             27.0     03-14    135  |  97<L>  |  41<H>  ----------------------------<  137<H>  4.3   |  22  |  1.9<H>    Ca    8.9      14 Mar 2021 07:40    TPro  6.2  /  Alb  3.9  /  TBili  0.7  /  DBili  x   /  AST  42<H>  /  ALT  17  /  AlkPhos  79  03-13    PT/INR - ( 14 Mar 2021 07:40 )   PT: 13.40 sec;   INR: 1.17 ratio         PTT - ( 14 Mar 2021 07:40 )  PTT:27.1 sec  Urinalysis Basic - ( 13 Mar 2021 15:20 )    Color: Red / Appearance: Cloudy / SG: <=1.005 / pH: x  Gluc: x / Ketone: 80  / Bili: Large / Urobili: 4.0   Blood: x / Protein: >=300 / Nitrite: Positive   Leuk Esterase: Large / RBC: >50 /HPF / WBC 10-25 /HPF   Sq Epi: x / Non Sq Epi: Negative / Bacteria: TNTC /HPF      Impression:bladder ca with continued hematuria.  tumor in bladder was extensive according to previous urologist.      Plan:serial hg- transfuse as necessary                  pelvic ct to check extent of tumor.                  uro-oncology aware and awaiting imaging

## 2021-03-15 NOTE — PROVIDER CONTACT NOTE (OTHER) - ACTION/TREATMENT ORDERED:
Both PA and MD stated that Urology consult to be ordered in the AM by primary team to discuss CBI. RNs to continue with irrigation as ordered.

## 2021-03-16 LAB
ALBUMIN SERPL ELPH-MCNC: 3.8 G/DL — SIGNIFICANT CHANGE UP (ref 3.5–5.2)
ALP SERPL-CCNC: 76 U/L — SIGNIFICANT CHANGE UP (ref 30–115)
ALT FLD-CCNC: 17 U/L — SIGNIFICANT CHANGE UP (ref 0–41)
ANION GAP SERPL CALC-SCNC: 10 MMOL/L — SIGNIFICANT CHANGE UP (ref 7–14)
AST SERPL-CCNC: 29 U/L — SIGNIFICANT CHANGE UP (ref 0–41)
BASOPHILS # BLD AUTO: 0.06 K/UL — SIGNIFICANT CHANGE UP (ref 0–0.2)
BASOPHILS # BLD AUTO: 0.08 K/UL — SIGNIFICANT CHANGE UP (ref 0–0.2)
BASOPHILS NFR BLD AUTO: 0.5 % — SIGNIFICANT CHANGE UP (ref 0–1)
BASOPHILS NFR BLD AUTO: 0.7 % — SIGNIFICANT CHANGE UP (ref 0–1)
BILIRUB SERPL-MCNC: 0.5 MG/DL — SIGNIFICANT CHANGE UP (ref 0.2–1.2)
BUN SERPL-MCNC: 24 MG/DL — HIGH (ref 10–20)
CALCIUM SERPL-MCNC: 9 MG/DL — SIGNIFICANT CHANGE UP (ref 8.5–10.1)
CHLORIDE SERPL-SCNC: 105 MMOL/L — SIGNIFICANT CHANGE UP (ref 98–110)
CO2 SERPL-SCNC: 27 MMOL/L — SIGNIFICANT CHANGE UP (ref 17–32)
CREAT SERPL-MCNC: 1.4 MG/DL — SIGNIFICANT CHANGE UP (ref 0.7–1.5)
EOSINOPHIL # BLD AUTO: 0.16 K/UL — SIGNIFICANT CHANGE UP (ref 0–0.7)
EOSINOPHIL # BLD AUTO: 0.2 K/UL — SIGNIFICANT CHANGE UP (ref 0–0.7)
EOSINOPHIL NFR BLD AUTO: 1.3 % — SIGNIFICANT CHANGE UP (ref 0–8)
EOSINOPHIL NFR BLD AUTO: 1.6 % — SIGNIFICANT CHANGE UP (ref 0–8)
GLUCOSE SERPL-MCNC: 108 MG/DL — HIGH (ref 70–99)
HCT VFR BLD CALC: 29.4 % — LOW (ref 42–52)
HCT VFR BLD CALC: 30.3 % — LOW (ref 42–52)
HGB BLD-MCNC: 10.2 G/DL — LOW (ref 14–18)
HGB BLD-MCNC: 9.9 G/DL — LOW (ref 14–18)
IMM GRANULOCYTES NFR BLD AUTO: 1.7 % — HIGH (ref 0.1–0.3)
IMM GRANULOCYTES NFR BLD AUTO: 1.8 % — HIGH (ref 0.1–0.3)
LYMPHOCYTES # BLD AUTO: 2.62 K/UL — SIGNIFICANT CHANGE UP (ref 1.2–3.4)
LYMPHOCYTES # BLD AUTO: 21.4 % — SIGNIFICANT CHANGE UP (ref 20.5–51.1)
LYMPHOCYTES # BLD AUTO: 25.5 % — SIGNIFICANT CHANGE UP (ref 20.5–51.1)
LYMPHOCYTES # BLD AUTO: 3.22 K/UL — SIGNIFICANT CHANGE UP (ref 1.2–3.4)
MCHC RBC-ENTMCNC: 32.1 PG — HIGH (ref 27–31)
MCHC RBC-ENTMCNC: 32.5 PG — HIGH (ref 27–31)
MCHC RBC-ENTMCNC: 33.7 G/DL — SIGNIFICANT CHANGE UP (ref 32–37)
MCHC RBC-ENTMCNC: 33.7 G/DL — SIGNIFICANT CHANGE UP (ref 32–37)
MCV RBC AUTO: 95.5 FL — HIGH (ref 80–94)
MCV RBC AUTO: 96.5 FL — HIGH (ref 80–94)
MONOCYTES # BLD AUTO: 0.93 K/UL — HIGH (ref 0.1–0.6)
MONOCYTES # BLD AUTO: 1.21 K/UL — HIGH (ref 0.1–0.6)
MONOCYTES NFR BLD AUTO: 7.4 % — SIGNIFICANT CHANGE UP (ref 1.7–9.3)
MONOCYTES NFR BLD AUTO: 9.9 % — HIGH (ref 1.7–9.3)
NEUTROPHILS # BLD AUTO: 7.94 K/UL — HIGH (ref 1.4–6.5)
NEUTROPHILS # BLD AUTO: 8.01 K/UL — HIGH (ref 1.4–6.5)
NEUTROPHILS NFR BLD AUTO: 63.5 % — SIGNIFICANT CHANGE UP (ref 42.2–75.2)
NEUTROPHILS NFR BLD AUTO: 64.7 % — SIGNIFICANT CHANGE UP (ref 42.2–75.2)
NRBC # BLD: 0 /100 WBCS — SIGNIFICANT CHANGE UP (ref 0–0)
NRBC # BLD: 0 /100 WBCS — SIGNIFICANT CHANGE UP (ref 0–0)
PLATELET # BLD AUTO: 303 K/UL — SIGNIFICANT CHANGE UP (ref 130–400)
PLATELET # BLD AUTO: 306 K/UL — SIGNIFICANT CHANGE UP (ref 130–400)
POTASSIUM SERPL-MCNC: 4.1 MMOL/L — SIGNIFICANT CHANGE UP (ref 3.5–5)
POTASSIUM SERPL-SCNC: 4.1 MMOL/L — SIGNIFICANT CHANGE UP (ref 3.5–5)
PROT SERPL-MCNC: 6.2 G/DL — SIGNIFICANT CHANGE UP (ref 6–8)
RBC # BLD: 3.08 M/UL — LOW (ref 4.7–6.1)
RBC # BLD: 3.14 M/UL — LOW (ref 4.7–6.1)
RBC # FLD: 14 % — SIGNIFICANT CHANGE UP (ref 11.5–14.5)
RBC # FLD: 14.1 % — SIGNIFICANT CHANGE UP (ref 11.5–14.5)
SODIUM SERPL-SCNC: 142 MMOL/L — SIGNIFICANT CHANGE UP (ref 135–146)
WBC # BLD: 12.26 K/UL — HIGH (ref 4.8–10.8)
WBC # BLD: 12.61 K/UL — HIGH (ref 4.8–10.8)
WBC # FLD AUTO: 12.26 K/UL — HIGH (ref 4.8–10.8)
WBC # FLD AUTO: 12.61 K/UL — HIGH (ref 4.8–10.8)

## 2021-03-16 PROCEDURE — 99233 SBSQ HOSP IP/OBS HIGH 50: CPT

## 2021-03-16 PROCEDURE — 99231 SBSQ HOSP IP/OBS SF/LOW 25: CPT

## 2021-03-16 RX ORDER — CEFTRIAXONE 500 MG/1
1000 INJECTION, POWDER, FOR SOLUTION INTRAMUSCULAR; INTRAVENOUS EVERY 24 HOURS
Refills: 0 | Status: DISCONTINUED | OUTPATIENT
Start: 2021-03-17 | End: 2021-03-19

## 2021-03-16 RX ADMIN — AMLODIPINE BESYLATE 10 MILLIGRAM(S): 2.5 TABLET ORAL at 05:47

## 2021-03-16 RX ADMIN — LATANOPROST 1 DROP(S): 0.05 SOLUTION/ DROPS OPHTHALMIC; TOPICAL at 21:25

## 2021-03-16 RX ADMIN — Medication 100 MILLIGRAM(S): at 11:10

## 2021-03-16 RX ADMIN — LOSARTAN POTASSIUM 100 MILLIGRAM(S): 100 TABLET, FILM COATED ORAL at 05:47

## 2021-03-16 RX ADMIN — BRIMONIDINE TARTRATE 1 DROP(S): 2 SOLUTION/ DROPS OPHTHALMIC at 16:19

## 2021-03-16 RX ADMIN — SODIUM CHLORIDE 100 MILLILITER(S): 9 INJECTION INTRAMUSCULAR; INTRAVENOUS; SUBCUTANEOUS at 11:10

## 2021-03-16 RX ADMIN — Medication 0.1 MILLIGRAM(S): at 18:08

## 2021-03-16 RX ADMIN — SODIUM CHLORIDE 100 MILLILITER(S): 9 INJECTION INTRAMUSCULAR; INTRAVENOUS; SUBCUTANEOUS at 02:00

## 2021-03-16 RX ADMIN — Medication 1 MILLIGRAM(S): at 02:00

## 2021-03-16 RX ADMIN — Medication 1 MILLIGRAM(S): at 11:10

## 2021-03-16 RX ADMIN — BRIMONIDINE TARTRATE 1 DROP(S): 2 SOLUTION/ DROPS OPHTHALMIC at 21:25

## 2021-03-16 RX ADMIN — Medication 1 MILLIGRAM(S): at 21:25

## 2021-03-16 RX ADMIN — Medication 0.1 MILLIGRAM(S): at 05:47

## 2021-03-16 RX ADMIN — Medication 20 MILLIGRAM(S): at 11:10

## 2021-03-16 RX ADMIN — BRIMONIDINE TARTRATE 1 DROP(S): 2 SOLUTION/ DROPS OPHTHALMIC at 05:47

## 2021-03-16 RX ADMIN — CEFTRIAXONE 100 MILLIGRAM(S): 500 INJECTION, POWDER, FOR SOLUTION INTRAMUSCULAR; INTRAVENOUS at 05:47

## 2021-03-16 RX ADMIN — CHLORHEXIDINE GLUCONATE 1 APPLICATION(S): 213 SOLUTION TOPICAL at 05:47

## 2021-03-16 NOTE — PROGRESS NOTE ADULT - ASSESSMENT
70 year old male with hematuria and clot retention from a newly diagnosed bladder mass.      Problem/Plan - 1:  ·  Problem: Hematuria withUrinary retention.  Plan: .  - Small catheter and irrigate every 6 hours and as needed for clot retention.  - Monitor I&O's.  - Continue Rocephin IVPB (UA consistent with UTI; patient was supposed to be on Cipro post-procedure but had issues getting medication and only just took first dose prior to admission)  - Hgb 9.1-->7.4-->7.1-->(2 units pRBCs transfused on 3/16)-->10.2.  Trend CBC BID  - HYACINTH : Creatinine 1.6 -->1.9-->1.6-->1.4 most likely from clot retention.  Monitor repeat Creatinine and continue IV hydration.-Urology consult appreciated, continue Small care and follow urine Cx  Patient for TURBT 3/17  -Patient currently asymptomatic from a cardiopulmonary standpoint and could perform >4 METS prior to admission.  -Pt is currently medically optimized for planned procedure as class II with 6% 30-day risk of Death, MI, or cardiac arrest.       Problem/Plan - 2:  ·  Problem: Malignant neoplasm of urinary bladder, unspecified site.  Plan: .  - Small catheter and irrigate every 6 hours and as needed for clot retention.  - As above, for TURBT 3/17     Problem/Plan - 3:  ·  Problem: Benign essential HTN.  Plan: .  - Continue DASH diet.  - Monitor BP/Vital signs.  - Continue meds from home. (Amlodipine 10 mg daily, Losartan 100 mg daily and Clonidine 0.1 mg BID).     Problem/Plan - 4:  ·  Problem: Gout, unspecified cause, unspecified chronicity, unspecified site.  Plan: .  - Continue Allopurinol 100 mg daily and F/U with PCP MD after D/C.      Problem/Plan - 5:  ·  Problem: Anxiety.  Plan: .  - F/U with psychiatrist after DC for further mgt.   -Discussed with son, patient is very anxious and takes at least 1 Xanax per day; might not ask for it here  -Will order 1 standing Xanax, then PRN BID     Problem/Plan - 6:  Problem: Depression, unspecified depression type. Plan: .  - Continue Prozac 20 mg daily.  - F/U with psychiatrist after DC for further mgt.      - VTE prophylaxis:  Mechanical and ambulation / exercises.  No chemical prophylaxis due to active Hematuria.    -Please obtain all consents from son and HCP Obdulio Payan.    -Dispo: For TURBT 3/17.  Follow with urology and consult PT once ready for DC planning.

## 2021-03-16 NOTE — PROGRESS NOTE ADULT - SUBJECTIVE AND OBJECTIVE BOX
70 year old male with hematuria and clot retention from a newly diagnosed bladder mass.     Today:  Seen at bedside, no new complaints.  Case discussed with son over the phone who is HCP.        REVIEW OF SYSTEMS:  +Hematuria, + suprapubic pressure      MEDICATIONS  (STANDING):  allopurinol 100 milliGRAM(s) Oral daily  ALPRAZolam 1 milliGRAM(s) Oral daily  amLODIPine   Tablet 10 milliGRAM(s) Oral daily  brimonidine 0.2% Ophthalmic Solution 1 Drop(s) Both EYES three times a day  chlorhexidine 4% Liquid 1 Application(s) Topical <User Schedule>  cloNIDine 0.1 milliGRAM(s) Oral two times a day  FLUoxetine 20 milliGRAM(s) Oral daily  latanoprost 0.005% Ophthalmic Solution 1 Drop(s) Both EYES at bedtime  losartan 100 milliGRAM(s) Oral daily  sodium chloride 0.9%. 1000 milliLiter(s) (100 mL/Hr) IV Continuous <Continuous>    MEDICATIONS  (PRN):  ALPRAZolam 1 milliGRAM(s) Oral two times a day PRN anxiety  aluminum hydroxide/magnesium hydroxide/simethicone Suspension 30 milliLiter(s) Oral every 4 hours PRN Dyspepsia  cyclobenzaprine 10 milliGRAM(s) Oral at bedtime PRN Muscle Spasm  ondansetron Injectable 4 milliGRAM(s) IV Push every 6 hours PRN Nausea  senna 2 Tablet(s) Oral at bedtime PRN Constipation      Allergies  No Known Allergies        FAMILY HISTORY:  FHx: hypertension  Mother    FH: type 2 diabetes  Mother        Vital Signs Last 24 Hrs  T(C): 36.9 (16 Mar 2021 05:21), Max: 37 (15 Mar 2021 22:06)  T(F): 98.5 (16 Mar 2021 05:21), Max: 98.6 (15 Mar 2021 22:06)  HR: 90 (16 Mar 2021 05:21) (86 - 94)  BP: 161/72 (16 Mar 2021 05:21) (113/55 - 161/72)  RR: 18 (16 Mar 2021 05:21) (16 - 18)      PHYSICAL EXAM:  GENERAL: NAD, well-groomed, well-developed  HEAD:  Atraumatic, Normocephalic  EYES: EOMI, PERRLA, conjunctiva and sclera clear  NECK: Supple, No JVD, Normal thyroid  NERVOUS SYSTEM:  Alert & Oriented X3, Good concentration  CHEST/LUNG: Clear to percussion bilaterally; No rales, rhonchi, wheezing, or rubs  HEART: Regular rate and rhythm; No murmurs, rubs, or gallops  ABDOMEN: Soft, Nontender, Nondistended; Bowel sounds present  EXTREMITIES:  2+ Peripheral Pulses, No clubbing, cyanosis, or edema  SKIN: No rashes or lesions      LABS:                        10.2   12.61 )-----------( 306      ( 16 Mar 2021 05:42 )             30.3     03-16    142  |  105  |  24<H>  ----------------------------<  108<H>  4.1   |  27  |  1.4    Ca    9.0      16 Mar 2021 05:42    TPro  6.2  /  Alb  3.8  /  TBili  0.5  /  DBili  x   /  AST  29  /  ALT  17  /  AlkPhos  76  03-16

## 2021-03-16 NOTE — PROGRESS NOTE ADULT - ASSESSMENT
70 yr old male with Bladder ca with continued hematuria.  tumor in bladder was extensive according to previous urologist.  CT A/P shows 8 cm hematoma vs neoplasm    - Pt for OR tomorrow 3/16: cysto/TURBT  - Continue brannon; flush PRN   - monitor H/H; transfuse PRN   - NPO P MN  - Please provide medical clearance/optimization note  - f/u rpt COVID today  - will D/W attending       70 yr old male with Bladder ca with continued hematuria.  tumor in bladder was extensive according to previous urologist.  CT A/P shows 8 cm hematoma vs neoplasm    - Pt for OR tomorrow 3/16: cysto/TURBT  - Continue brannon; flush PRN   - monitor H/H; transfuse PRN   - NPO P MN  - Please provide medical clearance/optimization note  - f/u rpt COVID today  - will D/W attending      ADDENDUM TO ABOVE  Pt seen and examined with Dr. Malcolm at 11 pm. In bed no complaints.   OR tomorrow for cysto/TURBT discussed, pt verbalized understanding.   -NPO after midnight  -F/U repeat COVID  -

## 2021-03-16 NOTE — PROGRESS NOTE ADULT - SUBJECTIVE AND OBJECTIVE BOX
S; Pt still w/moderate hematuria - s/p 2 PRBC's yesterday,. Denies abd pain. Pt for OR tomorrow  (cystoscopy/TURBT) - awaiting medical clearance  O: Vital Signs Last 24 Hrs  T(C): 36.9 (16 Mar 2021 05:21), Max: 37 (15 Mar 2021 22:06)  T(F): 98.5 (16 Mar 2021 05:21), Max: 98.6 (15 Mar 2021 22:06)  HR: 90 (16 Mar 2021 05:21) (86 - 94)  BP: 161/72 (16 Mar 2021 05:21) (113/55 - 161/72)  BP(mean): --  RR: 18 (16 Mar 2021 05:21) (16 - 18)  SpO2: --    I&O's Detail    15 Mar 2021 07:01  -  16 Mar 2021 07:00  --------------------------------------------------------  IN:  Total IN: 0 mL    OUT:    Ureteral Catheter (mL): 2200 mL  Total OUT: 2200 mL    Total NET: -2200 mL    MEDICATIONS  (STANDING):  allopurinol 100 milliGRAM(s) Oral daily  ALPRAZolam 1 milliGRAM(s) Oral daily  amLODIPine   Tablet 10 milliGRAM(s) Oral daily  brimonidine 0.2% Ophthalmic Solution 1 Drop(s) Both EYES three times a day  chlorhexidine 4% Liquid 1 Application(s) Topical <User Schedule>  cloNIDine 0.1 milliGRAM(s) Oral two times a day  FLUoxetine 20 milliGRAM(s) Oral daily  latanoprost 0.005% Ophthalmic Solution 1 Drop(s) Both EYES at bedtime  losartan 100 milliGRAM(s) Oral daily  sodium chloride 0.9%. 1000 milliLiter(s) (100 mL/Hr) IV Continuous <Continuous>    MEDICATIONS  (PRN):  ALPRAZolam 1 milliGRAM(s) Oral two times a day PRN anxiety  aluminum hydroxide/magnesium hydroxide/simethicone Suspension 30 milliLiter(s) Oral every 4 hours PRN Dyspepsia  cyclobenzaprine 10 milliGRAM(s) Oral at bedtime PRN Muscle Spasm  ondansetron Injectable 4 milliGRAM(s) IV Push every 6 hours PRN Nausea  senna 2 Tablet(s) Oral at bedtime PRN Constipation      EXAM;  : +brannon, moderate red hematuria    Labs:  CAPILLARY BLOOD GLUCOSE                              10.2   12.61 )-----------( 306      ( 16 Mar 2021 05:42 )             30.3       Auto Neutrophil %: 63.5 % (03-16-21 @ 05:42)  Auto Immature Granulocyte %: 1.8 % (03-16-21 @ 05:42)    03-16    142  |  105  |  24<H>  ----------------------------<  108<H>  4.1   |  27  |  1.4      Calcium, Total Serum: 9.0 mg/dL (03-16-21 @ 05:42)      LFTs:             6.2  | 0.5  | 29       ------------------[76      ( 16 Mar 2021 05:42 )  3.8  | x    | 17          Lipase:x      Amylase:x            Coags:    Culture - Urine (collected 13 Mar 2021 15:20)  Source: .Urine Clean Catch (Midstream)  Final Report (14 Mar 2021 20:34):    No growth       CT Abdomen and Pelvis No Cont (03.15.21 @ 09:08) >  IMPRESSION:    Irregular high density material within the bladder measuring 8.5 x 8.0 cm, likely representing hematoma and/or neoplasm, incompletely evaluated on this noncontrast examination.    Air within the bladder may be related to instrumentation of Brannon catheter placement    Moderate right hydroureteronephrosis and mild left hydroureteronephrosis to the level the bladder.    Distal esophageal wall thickening and outpatient direct visualization is recommended     S; Pt still w/moderate hematuria - s/p 2 PRBC's yesterday,. Denies abd pain. Pt for OR tomorrow  (cystoscopy/TURBT) - awaiting medical clearance  O: Vital Signs Last 24 Hrs  T(C): 36.9 (16 Mar 2021 05:21), Max: 37 (15 Mar 2021 22:06)  T(F): 98.5 (16 Mar 2021 05:21), Max: 98.6 (15 Mar 2021 22:06)  HR: 90 (16 Mar 2021 05:21) (86 - 94)  BP: 161/72 (16 Mar 2021 05:21) (113/55 - 161/72)  BP(mean): --  RR: 18 (16 Mar 2021 05:21) (16 - 18)  SpO2: --    I&O's Detail    15 Mar 2021 07:01  -  16 Mar 2021 07:00  --------------------------------------------------------  IN:  Total IN: 0 mL    OUT:    Urethral Catheter (mL): 2200 mL  Total OUT: 2200 mL    Total NET: -2200 mL    MEDICATIONS  (STANDING):  allopurinol 100 milliGRAM(s) Oral daily  ALPRAZolam 1 milliGRAM(s) Oral daily  amLODIPine   Tablet 10 milliGRAM(s) Oral daily  brimonidine 0.2% Ophthalmic Solution 1 Drop(s) Both EYES three times a day  chlorhexidine 4% Liquid 1 Application(s) Topical <User Schedule>  cloNIDine 0.1 milliGRAM(s) Oral two times a day  FLUoxetine 20 milliGRAM(s) Oral daily  latanoprost 0.005% Ophthalmic Solution 1 Drop(s) Both EYES at bedtime  losartan 100 milliGRAM(s) Oral daily  sodium chloride 0.9%. 1000 milliLiter(s) (100 mL/Hr) IV Continuous <Continuous>    MEDICATIONS  (PRN):  ALPRAZolam 1 milliGRAM(s) Oral two times a day PRN anxiety  aluminum hydroxide/magnesium hydroxide/simethicone Suspension 30 milliLiter(s) Oral every 4 hours PRN Dyspepsia  cyclobenzaprine 10 milliGRAM(s) Oral at bedtime PRN Muscle Spasm  ondansetron Injectable 4 milliGRAM(s) IV Push every 6 hours PRN Nausea  senna 2 Tablet(s) Oral at bedtime PRN Constipation      EXAM;  : Penis brannon in place without d/c +brannon, moderate red hematuria  Abdomen soft on  tender     Labs:  CAPILLARY BLOOD GLUCOSE                              10.2   12.61 )-----------( 306      ( 16 Mar 2021 05:42 )             30.3       Auto Neutrophil %: 63.5 % (03-16-21 @ 05:42)  Auto Immature Granulocyte %: 1.8 % (03-16-21 @ 05:42)    03-16    142  |  105  |  24<H>  ----------------------------<  108<H>  4.1   |  27  |  1.4      Calcium, Total Serum: 9.0 mg/dL (03-16-21 @ 05:42)      LFTs:             6.2  | 0.5  | 29       ------------------[76      ( 16 Mar 2021 05:42 )  3.8  | x    | 17          Lipase:x      Amylase:x          Culture - Urine (collected 13 Mar 2021 15:20)  Source: .Urine Clean Catch (Midstream)  Final Report (14 Mar 2021 20:34):    No growth       CT Abdomen and Pelvis No Cont (03.15.21 @ 09:08) >  IMPRESSION:    Irregular high density material within the bladder measuring 8.5 x 8.0 cm, likely representing hematoma and/or neoplasm, incompletely evaluated on this noncontrast examination.    Air within the bladder may be related to instrumentation of Brannon catheter placement    Moderate right hydroureteronephrosis and mild left hydroureteronephrosis to the level the bladder.    Distal esophageal wall thickening and outpatient direct visualization is recommended

## 2021-03-16 NOTE — PROGRESS NOTE ADULT - ATTENDING COMMENTS
the patient was seen and evaluated  he will undergo cystoscopy, evacuation of clots and TURBT  all questions answered the patient was seen and evaluated  he will undergo cystoscopy, evacuation of clots and TURBT  all questions answered    Pt seen in the evening after OR he was anxious to have this taken care of and had no further questions  He is on schedule for wednesday AM the patient was seen and evaluated  he will undergo cystoscopy, evacuation of clots and TURBT  all questions answered    Pt seen in the evening after OR he was anxious to have this taken care of and had no further questions  He is on schedule for wednesday AM    patient seen on the day in question. Note not reviewed and cosigned until now due to scheduling issues

## 2021-03-17 ENCOUNTER — RESULT REVIEW (OUTPATIENT)
Age: 71
End: 2021-03-17

## 2021-03-17 LAB
ALBUMIN SERPL ELPH-MCNC: 3.5 G/DL — SIGNIFICANT CHANGE UP (ref 3.5–5.2)
ALP SERPL-CCNC: 68 U/L — SIGNIFICANT CHANGE UP (ref 30–115)
ALT FLD-CCNC: 16 U/L — SIGNIFICANT CHANGE UP (ref 0–41)
ANION GAP SERPL CALC-SCNC: 11 MMOL/L — SIGNIFICANT CHANGE UP (ref 7–14)
APTT BLD: 29.1 SEC — SIGNIFICANT CHANGE UP (ref 27–39.2)
AST SERPL-CCNC: 23 U/L — SIGNIFICANT CHANGE UP (ref 0–41)
BASOPHILS # BLD AUTO: 0.07 K/UL — SIGNIFICANT CHANGE UP (ref 0–0.2)
BASOPHILS NFR BLD AUTO: 0.5 % — SIGNIFICANT CHANGE UP (ref 0–1)
BILIRUB SERPL-MCNC: 0.4 MG/DL — SIGNIFICANT CHANGE UP (ref 0.2–1.2)
BUN SERPL-MCNC: 17 MG/DL — SIGNIFICANT CHANGE UP (ref 10–20)
CALCIUM SERPL-MCNC: 8.7 MG/DL — SIGNIFICANT CHANGE UP (ref 8.5–10.1)
CHLORIDE SERPL-SCNC: 103 MMOL/L — SIGNIFICANT CHANGE UP (ref 98–110)
CO2 SERPL-SCNC: 26 MMOL/L — SIGNIFICANT CHANGE UP (ref 17–32)
CREAT SERPL-MCNC: 1.2 MG/DL — SIGNIFICANT CHANGE UP (ref 0.7–1.5)
EOSINOPHIL # BLD AUTO: 0.22 K/UL — SIGNIFICANT CHANGE UP (ref 0–0.7)
EOSINOPHIL NFR BLD AUTO: 1.6 % — SIGNIFICANT CHANGE UP (ref 0–8)
GLUCOSE SERPL-MCNC: 110 MG/DL — HIGH (ref 70–99)
HCT VFR BLD CALC: 28.6 % — LOW (ref 42–52)
HGB BLD-MCNC: 9.5 G/DL — LOW (ref 14–18)
IMM GRANULOCYTES NFR BLD AUTO: 1.8 % — HIGH (ref 0.1–0.3)
INR BLD: 1.07 RATIO — SIGNIFICANT CHANGE UP (ref 0.65–1.3)
LYMPHOCYTES # BLD AUTO: 23 % — SIGNIFICANT CHANGE UP (ref 20.5–51.1)
LYMPHOCYTES # BLD AUTO: 3.23 K/UL — SIGNIFICANT CHANGE UP (ref 1.2–3.4)
MCHC RBC-ENTMCNC: 31.9 PG — HIGH (ref 27–31)
MCHC RBC-ENTMCNC: 33.2 G/DL — SIGNIFICANT CHANGE UP (ref 32–37)
MCV RBC AUTO: 96 FL — HIGH (ref 80–94)
MONOCYTES # BLD AUTO: 1.09 K/UL — HIGH (ref 0.1–0.6)
MONOCYTES NFR BLD AUTO: 7.8 % — SIGNIFICANT CHANGE UP (ref 1.7–9.3)
NEUTROPHILS # BLD AUTO: 9.16 K/UL — HIGH (ref 1.4–6.5)
NEUTROPHILS NFR BLD AUTO: 65.3 % — SIGNIFICANT CHANGE UP (ref 42.2–75.2)
NRBC # BLD: 0 /100 WBCS — SIGNIFICANT CHANGE UP (ref 0–0)
PLATELET # BLD AUTO: 350 K/UL — SIGNIFICANT CHANGE UP (ref 130–400)
POTASSIUM SERPL-MCNC: 3.8 MMOL/L — SIGNIFICANT CHANGE UP (ref 3.5–5)
POTASSIUM SERPL-SCNC: 3.8 MMOL/L — SIGNIFICANT CHANGE UP (ref 3.5–5)
PROT SERPL-MCNC: 5.7 G/DL — LOW (ref 6–8)
PROTHROM AB SERPL-ACNC: 12.3 SEC — SIGNIFICANT CHANGE UP (ref 9.95–12.87)
RBC # BLD: 2.98 M/UL — LOW (ref 4.7–6.1)
RBC # FLD: 14.1 % — SIGNIFICANT CHANGE UP (ref 11.5–14.5)
SARS-COV-2 RNA SPEC QL NAA+PROBE: SIGNIFICANT CHANGE UP
SODIUM SERPL-SCNC: 140 MMOL/L — SIGNIFICANT CHANGE UP (ref 135–146)
WBC # BLD: 14.02 K/UL — HIGH (ref 4.8–10.8)
WBC # FLD AUTO: 14.02 K/UL — HIGH (ref 4.8–10.8)

## 2021-03-17 PROCEDURE — 88342 IMHCHEM/IMCYTCHM 1ST ANTB: CPT | Mod: 26

## 2021-03-17 PROCEDURE — 99233 SBSQ HOSP IP/OBS HIGH 50: CPT

## 2021-03-17 PROCEDURE — 88313 SPECIAL STAINS GROUP 2: CPT | Mod: 26

## 2021-03-17 PROCEDURE — 88307 TISSUE EXAM BY PATHOLOGIST: CPT | Mod: 26

## 2021-03-17 RX ORDER — ACETAMINOPHEN 500 MG
650 TABLET ORAL EVERY 6 HOURS
Refills: 0 | Status: DISCONTINUED | OUTPATIENT
Start: 2021-03-17 | End: 2021-03-19

## 2021-03-17 RX ORDER — OXYCODONE HYDROCHLORIDE 5 MG/1
10 TABLET ORAL EVERY 6 HOURS
Refills: 0 | Status: DISCONTINUED | OUTPATIENT
Start: 2021-03-17 | End: 2021-03-19

## 2021-03-17 RX ORDER — MORPHINE SULFATE 50 MG/1
4 CAPSULE, EXTENDED RELEASE ORAL
Refills: 0 | Status: DISCONTINUED | OUTPATIENT
Start: 2021-03-17 | End: 2021-03-17

## 2021-03-17 RX ORDER — ACETAMINOPHEN 500 MG
1000 TABLET ORAL ONCE
Refills: 0 | Status: DISCONTINUED | OUTPATIENT
Start: 2021-03-17 | End: 2021-03-17

## 2021-03-17 RX ORDER — KETOROLAC TROMETHAMINE 30 MG/ML
15 SYRINGE (ML) INJECTION EVERY 8 HOURS
Refills: 0 | Status: DISCONTINUED | OUTPATIENT
Start: 2021-03-17 | End: 2021-03-19

## 2021-03-17 RX ORDER — ONDANSETRON 8 MG/1
4 TABLET, FILM COATED ORAL ONCE
Refills: 0 | Status: DISCONTINUED | OUTPATIENT
Start: 2021-03-17 | End: 2021-03-17

## 2021-03-17 RX ORDER — SODIUM CHLORIDE 9 MG/ML
1000 INJECTION, SOLUTION INTRAVENOUS
Refills: 0 | Status: DISCONTINUED | OUTPATIENT
Start: 2021-03-17 | End: 2021-03-17

## 2021-03-17 RX ORDER — PHENAZOPYRIDINE HCL 100 MG
200 TABLET ORAL ONCE
Refills: 0 | Status: COMPLETED | OUTPATIENT
Start: 2021-03-17 | End: 2021-03-17

## 2021-03-17 RX ORDER — MEPERIDINE HYDROCHLORIDE 50 MG/ML
12.5 INJECTION INTRAMUSCULAR; INTRAVENOUS; SUBCUTANEOUS
Refills: 0 | Status: DISCONTINUED | OUTPATIENT
Start: 2021-03-17 | End: 2021-03-17

## 2021-03-17 RX ORDER — MORPHINE SULFATE 50 MG/1
2 CAPSULE, EXTENDED RELEASE ORAL
Refills: 0 | Status: DISCONTINUED | OUTPATIENT
Start: 2021-03-17 | End: 2021-03-17

## 2021-03-17 RX ADMIN — MORPHINE SULFATE 4 MILLIGRAM(S): 50 CAPSULE, EXTENDED RELEASE ORAL at 12:46

## 2021-03-17 RX ADMIN — Medication 200 MILLIGRAM(S): at 13:14

## 2021-03-17 RX ADMIN — BRIMONIDINE TARTRATE 1 DROP(S): 2 SOLUTION/ DROPS OPHTHALMIC at 21:00

## 2021-03-17 RX ADMIN — LOSARTAN POTASSIUM 100 MILLIGRAM(S): 100 TABLET, FILM COATED ORAL at 05:53

## 2021-03-17 RX ADMIN — LATANOPROST 1 DROP(S): 0.05 SOLUTION/ DROPS OPHTHALMIC; TOPICAL at 21:00

## 2021-03-17 RX ADMIN — Medication 0.1 MILLIGRAM(S): at 17:41

## 2021-03-17 RX ADMIN — AMLODIPINE BESYLATE 10 MILLIGRAM(S): 2.5 TABLET ORAL at 05:53

## 2021-03-17 RX ADMIN — SODIUM CHLORIDE 100 MILLILITER(S): 9 INJECTION INTRAMUSCULAR; INTRAVENOUS; SUBCUTANEOUS at 15:07

## 2021-03-17 RX ADMIN — Medication 0.1 MILLIGRAM(S): at 05:53

## 2021-03-17 RX ADMIN — Medication 1 MILLIGRAM(S): at 23:25

## 2021-03-17 RX ADMIN — BRIMONIDINE TARTRATE 1 DROP(S): 2 SOLUTION/ DROPS OPHTHALMIC at 15:07

## 2021-03-17 RX ADMIN — CEFTRIAXONE 100 MILLIGRAM(S): 500 INJECTION, POWDER, FOR SOLUTION INTRAMUSCULAR; INTRAVENOUS at 05:53

## 2021-03-17 RX ADMIN — BRIMONIDINE TARTRATE 1 DROP(S): 2 SOLUTION/ DROPS OPHTHALMIC at 05:54

## 2021-03-17 NOTE — BRIEF OPERATIVE NOTE - OPERATION/FINDINGS
bladder cancer - extensively involving the bladder neck region   may represent extension from the prostate

## 2021-03-17 NOTE — BRIEF OPERATIVE NOTE - NSICDXBRIEFPROCEDURE_GEN_ALL_CORE_FT
PROCEDURES:  Evacuation, clots, bladder 17-Mar-2021 12:36:11  Yandel Lopez  Evacuation, clots, bladder 17-Mar-2021 12:36:06  Yandel Lopez  Cystourethroscopy with bladder tumor resection, large 17-Mar-2021 12:33:06  Yandel Lopez

## 2021-03-17 NOTE — CHART NOTE - NSCHARTNOTEFT_GEN_A_CORE
I spoke to patient's son Nahid at bedside; answered all questions; very satisfied with the current plan of care and agreeable for the pain control as ordered.   team spoke to son and updates given  -discussed with CRISTOPHER PA; no irrigation at present; recent TURBT; pain meds prn with parameters ordered
PACU ANESTHESIA ADMISSION NOTE      Procedure: Cystoscopy, evacuation of clots, TURBT  Post op diagnosis:  Bladder CA    ____  Intubated  TV:______       Rate: ______      FiO2: ______    _x___  Patent Airway    __x__  Full return of protective reflexes    _x__  Full recovery from anesthesia / back to baseline     Vitals:   T:    98       R:   14              BP:           149/68       Sat:   100%                P: 81      Mental Status:  ____ x Awake   _____ Alert   _____ Drowsy   _____ Sedated    Nausea/Vomiting:  ___x NO  ______Yes,   See Post - Op Orders          Pain Scale (0-10):  _0____    Treatment: _x___ None    ____ See Post - Op/PCA Orders    Post - Operative Fluids:   ____ Oral   __x__ See Post - Op Orders    Plan: Discharge:   __x__Home       _____Floor     _____Critical Care    _____  Other:_________________    Comments:
Called by lab for critical Hct 20.9, Hgb 7.1.  Patient actively bleeding from bladder mass, brannon with red output.  D/W Medicine attending, will transfuse 2u PRBC's.  Urology informed.

## 2021-03-17 NOTE — PROGRESS NOTE ADULT - ASSESSMENT
70 year old male with hematuria and clot retention from a newly diagnosed bladder mass.      Problem/Plan - 1:  ·  Problem: Hematuria with Urinary retention/UTI/SIRS  - Small catheter and irrigate every 6 hours and as needed for clot retention.  - Monitor I&O's.  - Continue Rocephin IVPB  - Hgb 9.1-->7.4-->7.1-->(2 units pRBCs transfused on 3/16)-->10.2.  Trend CBC BID  - HYACINTH : Creatinine 1.6 -->1.9-->1.6-->1.4 most likely from clot retention.  Monitor repeat Creatinine and continue IV hydration.-Urology consult appreciated, continue Small care and follow urine Cx  Patient for TURBT 3/17 today  - following        Problem/Plan - 2:  ·  Problem: Malignant neoplasm of urinary bladder, unspecified site.  Plan: .  - Small catheter and irrigate every 6 hours and as needed for clot retention.  - As above, for TURBT today      Problem/Plan - 3:  ·  Problem: Benign essential HTN.  Plan: .  - Continue DASH diet.  - Monitor BP/Vital signs.  - Continue meds from home. (Amlodipine 10 mg daily, Losartan 100 mg daily and Clonidine 0.1 mg BID).     Problem/Plan - 4:  ·  Problem: Gout, unspecified cause, unspecified chronicity, unspecified site.  Plan: .  - Continue Allopurinol 100 mg daily and F/U with PCP MD after D/C.      Problem/Plan - 5:  ·  Problem: Anxiety.  Plan: .  - F/U with psychiatrist after DC for further mgt.   - xanax prn      Problem/Plan - 6:  Problem: Depression, unspecified depression type. Plan: .  - Continue Prozac 20 mg daily.  - F/U with psychiatrist after DC for further mgt.      - VTE prophylaxis:  Mechanical and ambulation / exercises.  No chemical prophylaxis due to active Hematuria.      Attending Physician Dr. Chelsea Lamb # [phone]

## 2021-03-17 NOTE — PROGRESS NOTE ADULT - SUBJECTIVE AND OBJECTIVE BOX
70 year old male with hematuria and clot retention from a newly diagnosed bladder mass.     Today:  -pt seen and examined at bedside   -scheduled for TURBT today with   - team to discuss plan of care from OR with the son today   -OOB to chair with assistance       REVIEW OF SYSTEMS:  +Hematuria, + suprapubic pressure        Allergies  No Known Allergies        FAMILY HISTORY:  FHx: hypertension  Mother    FH: type 2 diabetes  Mother    Vital Signs Last 24 Hrs  T(C): 36.7 (17 Mar 2021 12:24), Max: 37.2 (16 Mar 2021 21:32)  T(F): 98 (17 Mar 2021 12:24), Max: 98.9 (16 Mar 2021 21:32)  HR: 86 (17 Mar 2021 12:29) (83 - 89)  BP: 152/70 (17 Mar 2021 12:29) (145/65 - 161/84)  RR: 16 (17 Mar 2021 12:29) (12 - 16)  SpO2: 100% (17 Mar 2021 12:29) (100% - 100%)    PHYSICAL EXAM:  GENERAL: NAD, well-groomed, well-developed  HEAD:  Atraumatic, Normocephalic  EYES: EOMI, PERRLA, conjunctiva and sclera clear  NECK: Supple, No JVD, Normal thyroid  NERVOUS SYSTEM:  Alert & Oriented X3, Good concentration  CHEST/LUNG: Clear to percussion bilaterally; No rales, rhonchi, wheezing, or rubs  HEART: Regular rate and rhythm; No murmurs, rubs, or gallops  ABDOMEN: Soft, Nontender, Nondistended; Bowel sounds present  EXTREMITIES:  2+ Peripheral Pulses, No clubbing, cyanosis, or edema  SKIN: No rashes or lesions      LABS:                                 9.5    14.02 )-----------( 350      ( 17 Mar 2021 06:29 )             28.6       03-17    140  |  103  |  17  ----------------------------<  110<H>  3.8   |  26  |  1.2    Ca    8.7      17 Mar 2021 06:29    TPro  5.7<L>  /  Alb  3.5  /  TBili  0.4  /  DBili  x   /  AST  23  /  ALT  16  /  AlkPhos  68  03-17               10.2   12.61 )-----------( 306      ( 16 Mar 2021 05:42 )             30.3     03-16    142  |  105  |  24<H>  ----------------------------<  108<H>  4.1   |  27  |  1.4    Ca    9.0      16 Mar 2021 05:42    TPro  6.2  /  Alb  3.8  /  TBili  0.5  /  DBili  x   /  AST  29  /  ALT  17  /  AlkPhos  76  03-16      MEDICATIONS  (STANDING):  acetaminophen  IVPB .. 1000 milliGRAM(s) IV Intermittent once  allopurinol 100 milliGRAM(s) Oral daily  ALPRAZolam 1 milliGRAM(s) Oral daily  amLODIPine   Tablet 10 milliGRAM(s) Oral daily  brimonidine 0.2% Ophthalmic Solution 1 Drop(s) Both EYES three times a day  cefTRIAXone   IVPB 1000 milliGRAM(s) IV Intermittent every 24 hours  chlorhexidine 4% Liquid 1 Application(s) Topical <User Schedule>  cloNIDine 0.1 milliGRAM(s) Oral two times a day  FLUoxetine 20 milliGRAM(s) Oral daily  lactated ringers. 1000 milliLiter(s) (120 mL/Hr) IV Continuous <Continuous>  latanoprost 0.005% Ophthalmic Solution 1 Drop(s) Both EYES at bedtime  losartan 100 milliGRAM(s) Oral daily  sodium chloride 0.9%. 1000 milliLiter(s) (100 mL/Hr) IV Continuous <Continuous>    MEDICATIONS  (PRN):  ALPRAZolam 1 milliGRAM(s) Oral two times a day PRN anxiety  aluminum hydroxide/magnesium hydroxide/simethicone Suspension 30 milliLiter(s) Oral every 4 hours PRN Dyspepsia  cyclobenzaprine 10 milliGRAM(s) Oral at bedtime PRN Muscle Spasm  meperidine     Injectable 12.5 milliGRAM(s) IV Push every 10 minutes PRN Shivering  morphine  - Injectable 4 milliGRAM(s) IV Push every 10 minutes PRN Severe Pain (7 - 10)  morphine  - Injectable 2 milliGRAM(s) IV Push every 10 minutes PRN Moderate Pain (4 - 6)  ondansetron Injectable 4 milliGRAM(s) IV Push once PRN Nausea and/or Vomiting  ondansetron Injectable 4 milliGRAM(s) IV Push every 6 hours PRN Nausea  senna 2 Tablet(s) Oral at bedtime PRN Constipation

## 2021-03-17 NOTE — PROGRESS NOTE ADULT - ASSESSMENT
Assessment:	  70 yr old male with Bladder ca with continued gross hematuria.  Tumor in bladder was extensive according to previous urologist.  CT A/P shows 8 cm hematoma vs neoplasm. s/p 2 PRBCs transfusions 3/15    PLAN:   1. Patient going for Cysto, evacuation of clots, and TURBT  2. Monitor H/H  3. Continue Medical management    Patient seen and examined with Surgical PA   Assessment:	  70 yr old male with Bladder ca with continued gross hematuria.  Tumor in bladder was extensive according to previous urologist.  CT A/P shows 8 cm hematoma vs neoplasm. s/p 2 PRBCs transfusions 3/15    PLAN:   1. Patient going for Cysto, evacuation of clots, and TURBT with Dr. Lopez today.  2. Keep NPO for procedure.  3. Monitor H/H  4. Continue Medical management    Patient seen and examined with Surgical PA.        Addendum:  -Patient seen and examined with PA student this morning.  -Agree with about note and plan discussed.  -Continue NPO for procedure today with Dr. Lopez.   -Will follow up after his scheduled Cystoscopy and TURBT today.        Assessment:	  70 yr old male with Bladder ca with continued gross hematuria.  Tumor in bladder was extensive according to previous urologist.  CT A/P shows 8 cm hematoma vs neoplasm. s/p 2 PRBCs transfusions 3/15    PLAN:   1. Patient going for Cysto, evacuation of clots, and TURBT with Dr. Lopez today.  2. Keep NPO for procedure.  3. Monitor H/H  4. Continue Medical management    Patient seen and examined with Surgical PA.        Addendum:  -Patient seen and examined with PA student this morning.  -Agree with above note and the plan was discussed.  -Continue NPO for procedure today with Dr. Lopez.   -Case d/w Dr. Lopez.    -Will follow up after his scheduled Cystoscopy and TURBT today.

## 2021-03-17 NOTE — PROGRESS NOTE ADULT - SUBJECTIVE AND OBJECTIVE BOX
71 y/o male  Hospital day : 4d    Surgical Attending: Dr Smith  Overnight: No acute events. Denies abdominal pain, NPO diet after midnight for OR, no nausea and vomiting, flatus, afebrile.  Gross Hematuria.     Vitals  Vital Signs Last 24 Hrs  T(C): 35.9 (17 Mar 2021 09:38), Max: 37.2 (16 Mar 2021 21:32)  T(F): 96.7 (17 Mar 2021 08:25), Max: 98.9 (16 Mar 2021 21:32)  HR: 89 (17 Mar 2021 09:38) (84 - 89)  BP: 145/68 (17 Mar 2021 09:38) (145/65 - 161/84)  BP(mean): --  RR: 16 (17 Mar 2021 09:38) (16 - 16)  SpO2: --    --------------------------------------------------------    DIET/FLUIDS: NPO after midnight for OR Cysto 3/17                                                                                  Allergies; NKDA     MEDICATIONS:  Ceftriaxone 1000mg in dextrome 5% 50mL every 24hours  alprazolam 1mg PO daily   Allopurinol 100mg PO daily   Aluminum hydroxide/magnesium hydroxide/simethicone suspension 30mL PO every 4 hrs for dyspepsia PRN  Amlodipine 10mg PO daily   Losartan 100mg PO daily   Clonidine 10mg PO at bedtime   Fluoxetine 20mg PO daily   Zofran 4mg IV every 6 hours PRN        PHYSICAL EXAM:  GENERAL: NAD, Lying in bed in hospital attire  CHEST/LUNG: CTA bilaterally, no wheezes, rales, or rhonchi  HEART: S1, S2, Regular rate and rhythm  ABDOMEN: Soft, Nontender, Nondistended;   : Catheter placed, gross hematuria within  EXTREMITIES:  No clubbing, cyanosis, or edema      LABS  03-17    140  |  103  |  17  ----------------------------<  110<H>  3.8   |  26  |  1.2    Ca    8.7      17 Mar 2021 06:29    TPro  5.7<L>  /  Alb  3.5  /  TBili  0.4  /  DBili  x   /  AST  23  /  ALT  16  /  AlkPhos  68  03-17                            9.5    14.02 )-----------( 350      ( 17 Mar 2021 06:29 )             28.6     CAPILLARY BLOOD GLUCOSE    Radiology:   < from: CT Abdomen and Pelvis No Cont (03.15.21 @ 09:08) >  IMPRESSION:    Irregular high density material within the bladder measuring 8.5 x 8.0 cm, likely representing hematoma and/or neoplasm, incompletely evaluated on this noncontrast examination.    Air within the bladder may be related to instrumentation of Small catheter placement    Moderate right hydroureteronephrosis and mild left hydroureteronephrosis to the level the bladder.    Distal esophageal wall thickening and outpatient direct visualization is recommended       69 y/o male  Hospital day : 4d    Surgical Attending: Dr Smith  Overnight: No acute events.  Denies abdominal pain, NPO diet after midnight for OR, no nausea and vomiting, flatus, afebrile.  Gross Hematuria.  Patient scheduled for Cystoscopy and TURBT today with Dr. Lopez.      Vitals  Vital Signs Last 24 Hrs  T(C): 35.9 (17 Mar 2021 09:38), Max: 37.2 (16 Mar 2021 21:32)  T(F): 96.7 (17 Mar 2021 08:25), Max: 98.9 (16 Mar 2021 21:32)  HR: 89 (17 Mar 2021 09:38) (84 - 89)  BP: 145/68 (17 Mar 2021 09:38) (145/65 - 161/84)  BP(mean): --  RR: 16 (17 Mar 2021 09:38) (16 - 16)  SpO2: --    --------------------------------------------------------      DIET/FLUIDS: NPO after midnight for OR Cysto 3/17                                                                                    Allergies; NKDA       MEDICATIONS:  Ceftriaxone 1000mg in dextrome 5% 50mL every 24hours  alprazolam 1mg PO daily   Allopurinol 100mg PO daily   Aluminum hydroxide/magnesium hydroxide/simethicone suspension 30mL PO every 4 hrs for dyspepsia PRN  Amlodipine 10mg PO daily   Losartan 100mg PO daily   Clonidine 10mg PO at bedtime   Fluoxetine 20mg PO daily   Zofran 4mg IV every 6 hours PRN        PHYSICAL EXAM:  GENERAL: NAD, Lying in bed in hospital attire  CHEST/LUNG: CTA bilaterally, no wheezes, rales, or rhonchi  HEART: S1, S2, Regular rate and rhythm  ABDOMEN: Soft, Nontender, Nondistended;   : Small catheter in place, gross hematuria within tubing and collection bag  EXTREMITIES:  No clubbing, cyanosis, or edema, no calf tenderness        LABS  03-17    140  |  103  |  17  ----------------------------<  110<H>  3.8   |  26  |  1.2    Ca    8.7      17 Mar 2021 06:29    TPro  5.7<L>  /  Alb  3.5  /  TBili  0.4  /  DBili  x   /  AST  23  /  ALT  16  /  AlkPhos  68  03-17                            9.5    14.02 )-----------( 350      ( 17 Mar 2021 06:29 )             28.6           Radiology:   < from: CT Abdomen and Pelvis No Cont (03.15.21 @ 09:08) >  IMPRESSION:    Irregular high density material within the bladder measuring 8.5 x 8.0 cm, likely representing hematoma and/or neoplasm, incompletely evaluated on this noncontrast examination.    Air within the bladder may be related to instrumentation of Small catheter placement    Moderate right hydroureteronephrosis and mild left hydroureteronephrosis to the level the bladder.    Distal esophageal wall thickening and outpatient direct visualization is recommended

## 2021-03-17 NOTE — BRIEF OPERATIVE NOTE - NSICDXBRIEFPOSTOP_GEN_ALL_CORE_FT
POST-OP DIAGNOSIS:  Clot retention of urine 17-Mar-2021 12:45:26  Yandel Lopez  Bladder cancer 17-Mar-2021 12:45:08  Yandel Lopez

## 2021-03-17 NOTE — BRIEF OPERATIVE NOTE - NSICDXBRIEFPREOP_GEN_ALL_CORE_FT
PRE-OP DIAGNOSIS:  Bladder cancer 17-Mar-2021 12:44:58  Yandel Lopez  Clot retention of urine 17-Mar-2021 12:44:48  Yandel Loepz

## 2021-03-18 LAB
ANION GAP SERPL CALC-SCNC: 8 MMOL/L — SIGNIFICANT CHANGE UP (ref 7–14)
BUN SERPL-MCNC: 14 MG/DL — SIGNIFICANT CHANGE UP (ref 10–20)
CALCIUM SERPL-MCNC: 8.2 MG/DL — LOW (ref 8.5–10.1)
CHLORIDE SERPL-SCNC: 102 MMOL/L — SIGNIFICANT CHANGE UP (ref 98–110)
CO2 SERPL-SCNC: 27 MMOL/L — SIGNIFICANT CHANGE UP (ref 17–32)
CREAT SERPL-MCNC: 1.2 MG/DL — SIGNIFICANT CHANGE UP (ref 0.7–1.5)
GLUCOSE SERPL-MCNC: 91 MG/DL — SIGNIFICANT CHANGE UP (ref 70–99)
HCT VFR BLD CALC: 26.1 % — LOW (ref 42–52)
HGB BLD-MCNC: 8.5 G/DL — LOW (ref 14–18)
MCHC RBC-ENTMCNC: 31.8 PG — HIGH (ref 27–31)
MCHC RBC-ENTMCNC: 32.6 G/DL — SIGNIFICANT CHANGE UP (ref 32–37)
MCV RBC AUTO: 97.8 FL — HIGH (ref 80–94)
NRBC # BLD: 0 /100 WBCS — SIGNIFICANT CHANGE UP (ref 0–0)
PLATELET # BLD AUTO: 318 K/UL — SIGNIFICANT CHANGE UP (ref 130–400)
POTASSIUM SERPL-MCNC: 3.6 MMOL/L — SIGNIFICANT CHANGE UP (ref 3.5–5)
POTASSIUM SERPL-SCNC: 3.6 MMOL/L — SIGNIFICANT CHANGE UP (ref 3.5–5)
RBC # BLD: 2.67 M/UL — LOW (ref 4.7–6.1)
RBC # FLD: 14 % — SIGNIFICANT CHANGE UP (ref 11.5–14.5)
SODIUM SERPL-SCNC: 137 MMOL/L — SIGNIFICANT CHANGE UP (ref 135–146)
WBC # BLD: 12.8 K/UL — HIGH (ref 4.8–10.8)
WBC # FLD AUTO: 12.8 K/UL — HIGH (ref 4.8–10.8)

## 2021-03-18 PROCEDURE — 99231 SBSQ HOSP IP/OBS SF/LOW 25: CPT

## 2021-03-18 PROCEDURE — 99233 SBSQ HOSP IP/OBS HIGH 50: CPT

## 2021-03-18 RX ADMIN — SODIUM CHLORIDE 100 MILLILITER(S): 9 INJECTION INTRAMUSCULAR; INTRAVENOUS; SUBCUTANEOUS at 11:11

## 2021-03-18 RX ADMIN — Medication 20 MILLIGRAM(S): at 11:14

## 2021-03-18 RX ADMIN — Medication 0.1 MILLIGRAM(S): at 17:14

## 2021-03-18 RX ADMIN — Medication 1 MILLIGRAM(S): at 05:41

## 2021-03-18 RX ADMIN — Medication 100 MILLIGRAM(S): at 11:14

## 2021-03-18 RX ADMIN — Medication 0.1 MILLIGRAM(S): at 05:36

## 2021-03-18 RX ADMIN — CEFTRIAXONE 100 MILLIGRAM(S): 500 INJECTION, POWDER, FOR SOLUTION INTRAMUSCULAR; INTRAVENOUS at 05:36

## 2021-03-18 RX ADMIN — BRIMONIDINE TARTRATE 1 DROP(S): 2 SOLUTION/ DROPS OPHTHALMIC at 22:04

## 2021-03-18 RX ADMIN — LOSARTAN POTASSIUM 100 MILLIGRAM(S): 100 TABLET, FILM COATED ORAL at 05:36

## 2021-03-18 RX ADMIN — BRIMONIDINE TARTRATE 1 DROP(S): 2 SOLUTION/ DROPS OPHTHALMIC at 05:36

## 2021-03-18 RX ADMIN — AMLODIPINE BESYLATE 10 MILLIGRAM(S): 2.5 TABLET ORAL at 05:36

## 2021-03-18 RX ADMIN — Medication 1 MILLIGRAM(S): at 11:27

## 2021-03-18 RX ADMIN — LATANOPROST 1 DROP(S): 0.05 SOLUTION/ DROPS OPHTHALMIC; TOPICAL at 22:04

## 2021-03-18 RX ADMIN — BRIMONIDINE TARTRATE 1 DROP(S): 2 SOLUTION/ DROPS OPHTHALMIC at 16:15

## 2021-03-18 NOTE — PROGRESS NOTE ADULT - ASSESSMENT
70 yr old male admitted with hematuria and suspected bladder mass    POD#1 s/p TURBT    Case D/W Dr. Lopez:   - may D/C brannon this AM and once voids, may be D/C'd home from  perspective to F/U with Dr. Lopez on Thursday 3/25/21

## 2021-03-18 NOTE — PHYSICAL THERAPY INITIAL EVALUATION ADULT - ADDITIONAL COMMENTS
As per patient, resides alone in a private home, no stairs to enter but +13 steps inside.  Patient reports being independent with most mobility, however, says he did recently begin to use straight cane as needed while ambulating.

## 2021-03-18 NOTE — PHYSICAL THERAPY INITIAL EVALUATION ADULT - GENERAL OBSERVATIONS, REHAB EVAL
10:15 - 10:45. Chart reviewed. Patient available to be seen for physical therapy, confirmed with nurse. Patient encountered semi-reclined in bed. +IV, +brannon. Denies pain and is ready to walk with PT now.

## 2021-03-18 NOTE — PROGRESS NOTE ADULT - SUBJECTIVE AND OBJECTIVE BOX
70 year old male with hematuria and clot retention from a newly diagnosed bladder mass.     Today:  -pt seen and examined at bedside   -S/p TURBT yesterday  -pt on IV abx since March 13th; last day tomorrow   -oob to chair as tolerated with PT; did 100 feet using a walker with Physical therapist   -anticipate d/c tomorrow home with home care       REVIEW OF SYSTEMS:  +Hematuria, + suprapubic pressure        Allergies  No Known Allergies        FAMILY HISTORY:  FHx: hypertension  Mother    FH: type 2 diabetes  Mother    Vital Signs Last 24 Hrs  T(C): 36.7 (17 Mar 2021 12:24), Max: 37.2 (16 Mar 2021 21:32)  T(F): 98 (17 Mar 2021 12:24), Max: 98.9 (16 Mar 2021 21:32)  HR: 86 (17 Mar 2021 12:29) (83 - 89)  BP: 152/70 (17 Mar 2021 12:29) (145/65 - 161/84)  RR: 16 (17 Mar 2021 12:29) (12 - 16)  SpO2: 100% (17 Mar 2021 12:29) (100% - 100%)    PHYSICAL EXAM:  GENERAL: NAD, well-groomed, well-developed  HEAD:  Atraumatic, Normocephalic  EYES: EOMI, PERRLA, conjunctiva and sclera clear  NECK: Supple, No JVD, Normal thyroid  NERVOUS SYSTEM:  Alert & Oriented X3, Good concentration  CHEST/LUNG: Clear to percussion bilaterally; No rales, rhonchi, wheezing, or rubs  HEART: Regular rate and rhythm; No murmurs, rubs, or gallops  ABDOMEN: Soft, Nontender, Nondistended; Bowel sounds present  EXTREMITIES:  2+ Peripheral Pulses, No clubbing, cyanosis, or edema  SKIN: No rashes or lesions      LABS:                                            8.5    12.80 )-----------( 318      ( 18 Mar 2021 06:24 )             26.1     03-18    137  |  102  |  14  ----------------------------<  91  3.6   |  27  |  1.2    Ca    8.2<L>      18 Mar 2021 06:24    TPro  5.7<L>  /  Alb  3.5  /  TBili  0.4  /  DBili  x   /  AST  23  /  ALT  16  /  AlkPhos  68  03-17      MEDICATIONS  (STANDING):  allopurinol 100 milliGRAM(s) Oral daily  ALPRAZolam 1 milliGRAM(s) Oral daily  amLODIPine   Tablet 10 milliGRAM(s) Oral daily  brimonidine 0.2% Ophthalmic Solution 1 Drop(s) Both EYES three times a day  cefTRIAXone   IVPB 1000 milliGRAM(s) IV Intermittent every 24 hours  chlorhexidine 4% Liquid 1 Application(s) Topical <User Schedule>  cloNIDine 0.1 milliGRAM(s) Oral two times a day  FLUoxetine 20 milliGRAM(s) Oral daily  latanoprost 0.005% Ophthalmic Solution 1 Drop(s) Both EYES at bedtime  losartan 100 milliGRAM(s) Oral daily  sodium chloride 0.9%. 1000 milliLiter(s) (100 mL/Hr) IV Continuous <Continuous>    MEDICATIONS  (PRN):  acetaminophen   Tablet .. 650 milliGRAM(s) Oral every 6 hours PRN Temp greater or equal to 38C (100.4F), Mild Pain (1 - 3)  ALPRAZolam 1 milliGRAM(s) Oral two times a day PRN anxiety  aluminum hydroxide/magnesium hydroxide/simethicone Suspension 30 milliLiter(s) Oral every 4 hours PRN Dyspepsia  cyclobenzaprine 10 milliGRAM(s) Oral at bedtime PRN Muscle Spasm  ketorolac   Injectable 15 milliGRAM(s) IV Push every 8 hours PRN Moderate Pain (4 - 6)  ondansetron Injectable 4 milliGRAM(s) IV Push every 6 hours PRN Nausea  oxyCODONE    IR 10 milliGRAM(s) Oral every 6 hours PRN Severe Pain (7 - 10)  senna 2 Tablet(s) Oral at bedtime PRN Constipation

## 2021-03-18 NOTE — PHYSICAL THERAPY INITIAL EVALUATION ADULT - GAIT DEVIATIONS NOTED, PT EVAL
stooped posture, decreased heel strike / push off/decreased zahira/increased time in double stance/decreased step length/decreased weight-shifting ability

## 2021-03-18 NOTE — PROGRESS NOTE ADULT - SUBJECTIVE AND OBJECTIVE BOX
S:Pt doing well - brannon in place with clear, mildly concentrated  yellow urine in tubing; c/o some suprapubic pressure sensations; denies abdominal pain  O: Vital Signs Last 24 Hrs  T(C): 36.1 (18 Mar 2021 05:00), Max: 36.9 (17 Mar 2021 12:54)  T(F): 97 (18 Mar 2021 05:00), Max: 98.4 (17 Mar 2021 12:54)  HR: 80 (18 Mar 2021 05:00) (75 - 89)  BP: 136/65 (18 Mar 2021 05:00) (121/59 - 160/72)  BP(mean): --  RR: 16 (18 Mar 2021 05:00) (9 - 16)  SpO2: 95% (17 Mar 2021 13:24) (95% - 100%)    I&O's Detail    17 Mar 2021 07:01  -  18 Mar 2021 07:00  --------------------------------------------------------  IN:    Oral Fluid: 120 mL  Total IN: 120 mL    OUT:    Indwelling Catheter - Urethral (mL): 1100 mL  Total OUT: 1100 mL    Total NET: -980 mL      MEDICATIONS  (STANDING):  allopurinol 100 milliGRAM(s) Oral daily  ALPRAZolam 1 milliGRAM(s) Oral daily  amLODIPine   Tablet 10 milliGRAM(s) Oral daily  brimonidine 0.2% Ophthalmic Solution 1 Drop(s) Both EYES three times a day  cefTRIAXone   IVPB 1000 milliGRAM(s) IV Intermittent every 24 hours  chlorhexidine 4% Liquid 1 Application(s) Topical <User Schedule>  cloNIDine 0.1 milliGRAM(s) Oral two times a day  FLUoxetine 20 milliGRAM(s) Oral daily  latanoprost 0.005% Ophthalmic Solution 1 Drop(s) Both EYES at bedtime  losartan 100 milliGRAM(s) Oral daily  sodium chloride 0.9%. 1000 milliLiter(s) (100 mL/Hr) IV Continuous <Continuous>    MEDICATIONS  (PRN):  acetaminophen   Tablet .. 650 milliGRAM(s) Oral every 6 hours PRN Temp greater or equal to 38C (100.4F), Mild Pain (1 - 3)  ALPRAZolam 1 milliGRAM(s) Oral two times a day PRN anxiety  aluminum hydroxide/magnesium hydroxide/simethicone Suspension 30 milliLiter(s) Oral every 4 hours PRN Dyspepsia  cyclobenzaprine 10 milliGRAM(s) Oral at bedtime PRN Muscle Spasm  ketorolac   Injectable 15 milliGRAM(s) IV Push every 8 hours PRN Moderate Pain (4 - 6)  ondansetron Injectable 4 milliGRAM(s) IV Push every 6 hours PRN Nausea  oxyCODONE    IR 10 milliGRAM(s) Oral every 6 hours PRN Severe Pain (7 - 10)  senna 2 Tablet(s) Oral at bedtime PRN Constipation    EXAM:  abd: soft, NT/ND; brannon in place    Labs:  CAPILLARY BLOOD GLUCOSE                          8.5    12.80 )-----------( 318      ( 18 Mar 2021 06:24 )             26.1         Hemoglobin: 8.5 g/dL (03-18 @ 06:24)  Hemoglobin: 9.5 g/dL (03-17 @ 06:29)  Hemoglobin: 9.9 g/dL (03-16 @ 15:00)  Hemoglobin: 10.2 g/dL (03-16 @ 05:42)  Hemoglobin: 7.1 g/dL (03-15 @ 14:58)  Hemoglobin: 7.4 g/dL (03-15 @ 11:01)  Hemoglobin: 9.1 g/dL (03-14 @ 07:40)  Hemoglobin: 10.0 g/dL (03-13 @ 12:07)      03-18    137  |  102  |  14  ----------------------------<  91  3.6   |  27  |  1.2      Calcium, Total Serum: 8.2 mg/dL (03-18-21 @ 06:24)      LFTs:             5.7  | 0.4  | 23       ------------------[68      ( 17 Mar 2021 06:29 )  3.5  | x    | 16          Lipase:x      Amylase:x             Coags:     12.30  ----< 1.07    ( 17 Mar 2021 06:29 )     29.1

## 2021-03-18 NOTE — PROGRESS NOTE ADULT - ASSESSMENT
70 year old male with hematuria and clot retention from a newly diagnosed bladder mass.      Problem/Plan - 1:  ·  Problem: Hematuria with Urinary retention/UTI/SIRS  - Small catheter and irrigate every 6 hours and as needed for clot retention.  - Monitor I&O's.  - Continue Rocephin IVPB  - Hgb 9.1-->7.4-->7.1-->(2 units pRBCs transfused on 3/16)-->10.2.  Trend CBC BID  - HYAICNTH : Creatinine 1.6 -->1.9-->1.6-->1.4 most likely from clot retention.  Monitor repeat Creatinine and continue IV hydration.-Urology consult appreciated, continue Small care and follow urine Cx  Patient s/p TURBT 3/17 yesterday   - following   -TOV today (son aware and agreeable)       Problem/Plan - 2:  ·  Problem: Malignant neoplasm of urinary bladder, unspecified site.  Plan: .  -follow up pathology report sent yesterday      Problem/Plan - 3:  ·  Problem: Benign essential HTN.  Plan: .  - Continue DASH diet.  - Monitor BP/Vital signs.  - Continue meds from home. (Amlodipine 10 mg daily, Losartan 100 mg daily and Clonidine 0.1 mg BID).     Problem/Plan - 4:  ·  Problem: Gout, unspecified cause, unspecified chronicity, unspecified site.  Plan: .  - Continue Allopurinol 100 mg daily and F/U with PCP MD after D/C.      Problem/Plan - 5:  ·  Problem: Anxiety.  Plan: .  - F/U with psychiatrist after DC for further mgt.   - xanax prn      Problem/Plan - 6:  Problem: Depression, unspecified depression type. Plan: .  - Continue Prozac 20 mg daily.  - F/U with psychiatrist after DC for further mgt.      dvt and gi ppx       # Progress Note Handoff  PENDING as follows  consults:  follow up today   Test: CBC  Family discussion: discussed with patient and son Nahid over the phone and at bedside; answered all questions   Disposition: home with home care in 24 hrs       Attending Physician Dr. Chelsea Lamb # [id number]

## 2021-03-19 ENCOUNTER — TRANSCRIPTION ENCOUNTER (OUTPATIENT)
Age: 71
End: 2021-03-19

## 2021-03-19 VITALS
SYSTOLIC BLOOD PRESSURE: 142 MMHG | HEART RATE: 97 BPM | RESPIRATION RATE: 16 BRPM | DIASTOLIC BLOOD PRESSURE: 80 MMHG | TEMPERATURE: 99 F

## 2021-03-19 PROCEDURE — 99239 HOSP IP/OBS DSCHRG MGMT >30: CPT

## 2021-03-19 RX ORDER — MOXIFLOXACIN HYDROCHLORIDE TABLETS, 400 MG 400 MG/1
1 TABLET, FILM COATED ORAL
Qty: 0 | Refills: 0 | DISCHARGE

## 2021-03-19 RX ORDER — OXYCODONE HYDROCHLORIDE 5 MG/1
1 TABLET ORAL
Qty: 10 | Refills: 0
Start: 2021-03-19 | End: 2021-03-23

## 2021-03-19 RX ADMIN — Medication 100 MILLIGRAM(S): at 11:30

## 2021-03-19 RX ADMIN — CEFTRIAXONE 100 MILLIGRAM(S): 500 INJECTION, POWDER, FOR SOLUTION INTRAMUSCULAR; INTRAVENOUS at 05:29

## 2021-03-19 RX ADMIN — BRIMONIDINE TARTRATE 1 DROP(S): 2 SOLUTION/ DROPS OPHTHALMIC at 05:29

## 2021-03-19 RX ADMIN — LOSARTAN POTASSIUM 100 MILLIGRAM(S): 100 TABLET, FILM COATED ORAL at 05:29

## 2021-03-19 RX ADMIN — Medication 0.1 MILLIGRAM(S): at 05:29

## 2021-03-19 RX ADMIN — Medication 1 MILLIGRAM(S): at 11:29

## 2021-03-19 RX ADMIN — Medication 20 MILLIGRAM(S): at 11:30

## 2021-03-19 RX ADMIN — AMLODIPINE BESYLATE 10 MILLIGRAM(S): 2.5 TABLET ORAL at 05:29

## 2021-03-19 NOTE — PROGRESS NOTE ADULT - PROVIDER SPECIALTY LIST ADULT
Urology
Hospitalist
Urology
Hospitalist
Urology
Hospitalist

## 2021-03-19 NOTE — PROGRESS NOTE ADULT - ASSESSMENT
70 year old male with hematuria and clot retention from a newly diagnosed bladder mass.      Problem/Plan - 1:  ·  Problem: Hematuria with Urinary retention/UTI/SIRS --- RESOLVED   - finished course of Rocephin inpatient  - Hgb 9.1-->7.4-->7.1-->(2 units pRBCs transfused on 3/16)-->10.2.  Trend CBC BID  - HYACINTH : Creatinine 1.6 -->1.9-->1.6-->1.4 most likely from clot retention.  Monitor repeat Creatinine and continue IV hydration.-Urology consult appreciated, continue Small care and follow urine Cx  Patient s/p TURBT 3/17  - follow up in the community   -TOV successful; patient urinated        Problem/Plan - 2:  ·  Problem: Malignant neoplasm of urinary bladder, unspecified site.  Plan: .  -follow up pathology report on the outside (son Nahid aware and will f/u 100%)      Problem/Plan - 3:  ·  Problem: Benign essential HTN.  Plan: .  - Continue DASH diet.  - Monitor BP/Vital signs.  - Continue meds from home. (Amlodipine 10 mg daily, Losartan 100 mg daily and Clonidine 0.1 mg BID).     Problem/Plan - 4:  ·  Problem: Gout, unspecified cause, unspecified chronicity, unspecified site.  Plan: .  - Continue Allopurinol 100 mg daily and F/U with PCP MD after D/C.      Problem/Plan - 5:  ·  Problem: Anxiety.  Plan: .  - F/U with psychiatrist after DC for further mgt.   - xanax prn      Problem/Plan - 6:  Problem: Depression, unspecified depression type. Plan: .  - Continue Prozac 20 mg daily.  - F/U with psychiatrist after DC for further mgt.    dvt and gi ppx     # Progress Note Handoff  PENDING as follows  consults:  follow up in the community   Family discussion: discussed with patient and son Nahid; answered all questions and agreeable for today's d/c   Dispo: home with home care today     Attending Physician Dr. Chelsea Lamb # [id number]     d/c papers done by me

## 2021-03-19 NOTE — DISCHARGE NOTE PROVIDER - CARE PROVIDERS DIRECT ADDRESSES
[email or web address]

## 2021-03-19 NOTE — DISCHARGE NOTE PROVIDER - HOSPITAL COURSE
patient with prolonged hospitalization found to have bladder mass complicated by hematuria and clot retention; required inpatient TURBT with   urology team; finished course of IV Rocephin inpatient; hospital course also required blood transfusion.  HYACINTH improved with IVF  -pt did well and cleared for home d/c from  perspective for outpatient follow up with Urology and Pathology report f/u from the OR inpatient   -pt voided after brannon removal (hematuria resolved)  -seen and examined the day of discharge and doing well for home d/c today  -He ambulated with Physical therapist 100 feet and stable for home d/c   -plan of care discussed with son Nahid in length on a daily basis and fully agreeable for above plan of care     Spent over 35 mins d/c planning

## 2021-03-19 NOTE — DISCHARGE NOTE PROVIDER - NSDCCPCAREPLAN_GEN_ALL_CORE_FT
PRINCIPAL DISCHARGE DIAGNOSIS  Diagnosis: Urinary retention  Assessment and Plan of Treatment: brannon removed and you voided in the hospital.   Please follow up with Urologist      SECONDARY DISCHARGE DIAGNOSES  Diagnosis: Hematuria, gross  Assessment and Plan of Treatment: resolved  -Please follow up pathology report from TURBT procedure done in the hospital

## 2021-03-19 NOTE — DISCHARGE NOTE NURSING/CASE MANAGEMENT/SOCIAL WORK - PATIENT PORTAL LINK FT
You can access the FollowMyHealth Patient Portal offered by [facility] by registering at the following website: [email or web address]. By joining [employer]’s FollowMyHealth portal, you will also be able to view your health information using other applications (apps) compatible with our system.

## 2021-03-19 NOTE — PROGRESS NOTE ADULT - SUBJECTIVE AND OBJECTIVE BOX
70 year old male with hematuria and clot retention from a newly diagnosed bladder mass.     Today:  -pt seen and examined at bedside   -S/p TURBT Wednesday  -finished course of antibiotics   -Small removed yesterday; passed TOV and urinating on his own   -spoke to son Nahid in length; agreeable for today's discharge   -home with home care d/c today     REVIEW OF SYSTEMS:  -no complaints     Allergies  No Known Allergies    FAMILY HISTORY:  FHx: hypertension  Mother    FH: type 2 diabetes  Mother    Vital Signs Last 24 Hrs  T(C): 37.2 (19 Mar 2021 05:20), Max: 37.2 (19 Mar 2021 05:20)  T(F): 98.9 (19 Mar 2021 05:20), Max: 98.9 (19 Mar 2021 05:20)  HR: 97 (19 Mar 2021 05:20) (74 - 97)  BP: 142/80 (19 Mar 2021 05:20) (139/62 - 162/72)  RR: 16 (19 Mar 2021 05:20) (16 - 16)    PHYSICAL EXAM:  GENERAL: NAD, well-groomed, well-developed  HEAD:  Atraumatic, Normocephalic  EYES: EOMI, PERRLA, conjunctiva and sclera clear  NECK: Supple, No JVD, Normal thyroid  NERVOUS SYSTEM:  Alert & Oriented X3, Good concentration  CHEST/LUNG: Clear to percussion bilaterally; No rales, rhonchi, wheezing, or rubs  HEART: Regular rate and rhythm; No murmurs, rubs, or gallops  ABDOMEN: Soft, Nontender, Nondistended; Bowel sounds present  EXTREMITIES:  2+ Peripheral Pulses, No clubbing, cyanosis, or edema  SKIN: No rashes or lesions      LABS:                                            8.5    12.80 )-----------( 318      ( 18 Mar 2021 06:24 )             26.1     03-18    137  |  102  |  14  ----------------------------<  91  3.6   |  27  |  1.2    Ca    8.2<L>      18 Mar 2021 06:24    TPro  5.7<L>  /  Alb  3.5  /  TBili  0.4  /  DBili  x   /  AST  23  /  ALT  16  /  AlkPhos  68  03-17      MEDICATIONS  (STANDING):  allopurinol 100 milliGRAM(s) Oral daily  ALPRAZolam 1 milliGRAM(s) Oral daily  amLODIPine   Tablet 10 milliGRAM(s) Oral daily  brimonidine 0.2% Ophthalmic Solution 1 Drop(s) Both EYES three times a day  cefTRIAXone   IVPB 1000 milliGRAM(s) IV Intermittent every 24 hours  chlorhexidine 4% Liquid 1 Application(s) Topical <User Schedule>  cloNIDine 0.1 milliGRAM(s) Oral two times a day  FLUoxetine 20 milliGRAM(s) Oral daily  latanoprost 0.005% Ophthalmic Solution 1 Drop(s) Both EYES at bedtime  losartan 100 milliGRAM(s) Oral daily  sodium chloride 0.9%. 1000 milliLiter(s) (100 mL/Hr) IV Continuous <Continuous>    MEDICATIONS  (PRN):  acetaminophen   Tablet .. 650 milliGRAM(s) Oral every 6 hours PRN Temp greater or equal to 38C (100.4F), Mild Pain (1 - 3)  ALPRAZolam 1 milliGRAM(s) Oral two times a day PRN anxiety  aluminum hydroxide/magnesium hydroxide/simethicone Suspension 30 milliLiter(s) Oral every 4 hours PRN Dyspepsia  cyclobenzaprine 10 milliGRAM(s) Oral at bedtime PRN Muscle Spasm  ketorolac   Injectable 15 milliGRAM(s) IV Push every 8 hours PRN Moderate Pain (4 - 6)  ondansetron Injectable 4 milliGRAM(s) IV Push every 6 hours PRN Nausea  oxyCODONE    IR 10 milliGRAM(s) Oral every 6 hours PRN Severe Pain (7 - 10)  senna 2 Tablet(s) Oral at bedtime PRN Constipation

## 2021-03-19 NOTE — PROGRESS NOTE ADULT - REASON FOR ADMISSION
Persistent hematuria

## 2021-03-19 NOTE — DISCHARGE NOTE PROVIDER - NSDCMRMEDTOKEN_GEN_ALL_CORE_FT
allopurinol 100 mg oral tablet: 1 tab(s) orally once a day  amLODIPine 10 mg oral tablet: 1 tab(s) orally once a day  brimonidine 0.15% ophthalmic solution: 1 drop(s) to each affected eye 3 times a day  cloNIDine 0.1 mg oral tablet: 1 tab(s) orally 2 times a day  cyclobenzaprine: 10 milligram(s) orally once a day (at bedtime), As Needed  latanoprost 0.005% ophthalmic emulsion: 1 drop(s) to each affected eye once a day (in the evening)  losartan 100 mg oral tablet: 1 tab(s) orally once a day  oxyCODONE 10 mg oral tablet: 1 tab(s) orally 2 times a day, As Needed -Severe Pain (7 - 10) MDD:2  PROzac 20 mg oral capsule: 1 cap(s) orally once a day  Xanax 1 mg oral tablet: 1  orally once a day (at bedtime), As Needed

## 2021-03-19 NOTE — DISCHARGE NOTE PROVIDER - NSDCHHCONTACT_GEN_ALL_CORE_FT
2/2 volume depletion. Persistent even after arrhythmia/tachycardia resolved. Improving with fluids.   As certified below, I, or a nurse practitioner or physician assistant working with me, had a face-to-face encounter that meets the physician face-to-face encounter requirements.

## 2021-03-23 PROBLEM — I10 ESSENTIAL (PRIMARY) HYPERTENSION: Chronic | Status: ACTIVE | Noted: 2021-03-13

## 2021-03-23 PROBLEM — Z97.8 PRESENCE OF OTHER SPECIFIED DEVICES: Chronic | Status: ACTIVE | Noted: 2021-03-13

## 2021-03-23 PROBLEM — F32.9 MAJOR DEPRESSIVE DISORDER, SINGLE EPISODE, UNSPECIFIED: Chronic | Status: ACTIVE | Noted: 2021-03-13

## 2021-03-23 PROBLEM — M10.9 GOUT, UNSPECIFIED: Chronic | Status: ACTIVE | Noted: 2021-03-13

## 2021-03-23 PROBLEM — R33.8 OTHER RETENTION OF URINE: Chronic | Status: ACTIVE | Noted: 2021-03-13

## 2021-03-23 PROBLEM — R31.9 HEMATURIA, UNSPECIFIED: Chronic | Status: ACTIVE | Noted: 2021-03-13

## 2021-03-23 PROBLEM — F41.9 ANXIETY DISORDER, UNSPECIFIED: Chronic | Status: ACTIVE | Noted: 2021-03-13

## 2021-03-23 PROBLEM — C67.9 MALIGNANT NEOPLASM OF BLADDER, UNSPECIFIED: Chronic | Status: ACTIVE | Noted: 2021-03-13

## 2021-03-25 ENCOUNTER — APPOINTMENT (OUTPATIENT)
Dept: UROLOGY | Facility: CLINIC | Age: 71
End: 2021-03-25
Payer: MEDICARE

## 2021-03-25 VITALS — BODY MASS INDEX: 30.31 KG/M2 | TEMPERATURE: 97.7 F | WEIGHT: 200 LBS | HEIGHT: 68 IN

## 2021-03-25 LAB — SURGICAL PATHOLOGY STUDY: SIGNIFICANT CHANGE UP

## 2021-03-25 PROCEDURE — 99214 OFFICE O/P EST MOD 30 MIN: CPT

## 2021-03-25 NOTE — CDI QUERY NOTE - NSCDIOTHERTXTBX_GEN_ALL_CORE_HH
--------------------------------------------------------------------------------------------------------------------------------------    Clinical Indicator:  ED: Flowsheet: V/S:  3/13/2021:   EZ=380  Labs:   WBC= 18.28    3/13/2021: Attending:  H/P:  70 year old male with hematuria and clot retention from a newly diagnosed bladder mass.                                                       Possible UTI:  WBC=18 today --> Follow U/A and urine cultures, F/U WBC count.  Start Iv abx -- Rocephin 1 gram IV q 24 hours. (Pt was on  Oral Cipro at home after this procedure)  3/14/2021: Attending:   Progress Note: Continue Rocephin IVPB (UA consistent with UTI; patient was supposed to be on Cipro post-procedure but had issues getting medication and only just took first dose last night                                                                                      3/17/2021: Attending:  Progress Note:   Problem: Hematuria with Urinary retention/UTI/SIRS    Meds: Ceftriaxone IV (3/13/2021 – 3/19/2021)    Based on your professional judgment and clinical indicators, can the diagnosis of SIRS be further specified as:    [ ] SIRS with Sepsis associated with UTI, present on admission  [ ] SIRS without Sepsis due to (please specify)  [ ] Other (please specify)  [ ] Unable to clinically determine    Thank you.

## 2021-03-25 NOTE — HISTORY OF PRESENT ILLNESS
[FreeTextEntry1] : 70 yo with bladder cancer\par s/p cystoscopy, bladder tumor resection and clot evacuation \par the patient is here with his son to discuss management\par \par I explained that the pathology has not been officially signed off of\par \par he has not experienced any bleeding since surgery\par \par

## 2021-03-25 NOTE — ASSESSMENT
[FreeTextEntry1] : 70 yo with bladder cancer\par the official path is pending\par \par - med oncology evaluation Dr. Eller\par - f/ in 1 week with telehealth to review pathology

## 2021-03-25 NOTE — LETTER BODY
[Dear  ___] : Dear  [unfilled], [Consult Letter:] : I had the pleasure of evaluating your patient, [unfilled]. [Please see my note below.] : Please see my note below. [Sincerely,] : Sincerely, [FreeTextEntry3] : Bhavana Lopez MD, FACS\par

## 2021-04-01 ENCOUNTER — APPOINTMENT (OUTPATIENT)
Dept: UROLOGY | Facility: CLINIC | Age: 71
End: 2021-04-01
Payer: MEDICARE

## 2021-04-01 DIAGNOSIS — F41.8 OTHER SPECIFIED ANXIETY DISORDERS: ICD-10-CM

## 2021-04-01 DIAGNOSIS — N17.8 OTHER ACUTE KIDNEY FAILURE: ICD-10-CM

## 2021-04-01 DIAGNOSIS — C67.0 MALIGNANT NEOPLASM OF TRIGONE OF BLADDER: ICD-10-CM

## 2021-04-01 DIAGNOSIS — R33.9 RETENTION OF URINE, UNSPECIFIED: ICD-10-CM

## 2021-04-01 DIAGNOSIS — N39.0 URINARY TRACT INFECTION, SITE NOT SPECIFIED: ICD-10-CM

## 2021-04-01 DIAGNOSIS — A41.9 SEPSIS, UNSPECIFIED ORGANISM: ICD-10-CM

## 2021-04-01 DIAGNOSIS — M10.9 GOUT, UNSPECIFIED: ICD-10-CM

## 2021-04-01 DIAGNOSIS — Z87.891 PERSONAL HISTORY OF NICOTINE DEPENDENCE: ICD-10-CM

## 2021-04-01 DIAGNOSIS — H40.9 UNSPECIFIED GLAUCOMA: ICD-10-CM

## 2021-04-01 DIAGNOSIS — I10 ESSENTIAL (PRIMARY) HYPERTENSION: ICD-10-CM

## 2021-04-01 PROCEDURE — 99214 OFFICE O/P EST MOD 30 MIN: CPT | Mod: 95

## 2021-04-02 NOTE — PHYSICAL EXAM
[General Appearance - Well Nourished] : well nourished [General Appearance - Well Developed] : well developed [] : no respiratory distress [Oriented To Time, Place, And Person] : oriented to person, place, and time [Not Anxious] : not anxious

## 2021-04-02 NOTE — HISTORY OF PRESENT ILLNESS
[Home] : at home, [unfilled] , at the time of the visit. [Medical Office: ([facility])___] : at the medical office located in  [Verbal consent obtained from patient] : the patient, [unfilled] [FreeTextEntry1] : telephone number [phone]\par email of [email or web address]\par \par 72 yo with bladder cancer\par s/p cystoscopy, bladder tumor resection and clot evacuation \par \par path 3/25/21\par muscle invasive bladder cancer\par \par he has not experienced any bleeding since surgery\par \par I reviewed the pathology with the patient and his son

## 2021-04-02 NOTE — ASSESSMENT
[FreeTextEntry1] : 70 yo with bladder cancer\par invasive into the muscle\par \par discussed the role of neoadjuvant chemotherapy followed by urinary diversion\par they are scheduled to see medical oncology\par will f/u after evaluation with med oncology\par \par I explained the pathology in detail\par all questions answered

## 2021-04-08 ENCOUNTER — APPOINTMENT (OUTPATIENT)
Dept: HEMATOLOGY ONCOLOGY | Facility: CLINIC | Age: 71
End: 2021-04-08
Payer: MEDICARE

## 2021-04-08 ENCOUNTER — OUTPATIENT (OUTPATIENT)
Dept: OUTPATIENT SERVICES | Facility: HOSPITAL | Age: 71
LOS: 1 days | Discharge: HOME | End: 2021-04-08

## 2021-04-08 ENCOUNTER — LABORATORY RESULT (OUTPATIENT)
Age: 71
End: 2021-04-08

## 2021-04-08 VITALS
HEIGHT: 68 IN | WEIGHT: 204 LBS | RESPIRATION RATE: 14 BRPM | DIASTOLIC BLOOD PRESSURE: 72 MMHG | TEMPERATURE: 97.6 F | BODY MASS INDEX: 30.92 KG/M2 | HEART RATE: 75 BPM | SYSTOLIC BLOOD PRESSURE: 153 MMHG

## 2021-04-08 DIAGNOSIS — Z86.69 PERSONAL HISTORY OF OTHER DISEASES OF THE NERVOUS SYSTEM AND SENSE ORGANS: ICD-10-CM

## 2021-04-08 DIAGNOSIS — Z86.59 PERSONAL HISTORY OF OTHER MENTAL AND BEHAVIORAL DISORDERS: ICD-10-CM

## 2021-04-08 DIAGNOSIS — Z86.79 PERSONAL HISTORY OF OTHER DISEASES OF THE CIRCULATORY SYSTEM: ICD-10-CM

## 2021-04-08 DIAGNOSIS — C67.9 MALIGNANT NEOPLASM OF BLADDER, UNSPECIFIED: ICD-10-CM

## 2021-04-08 DIAGNOSIS — H26.9 UNSPECIFIED CATARACT: Chronic | ICD-10-CM

## 2021-04-08 DIAGNOSIS — D50.0 IRON DEFICIENCY ANEMIA SECONDARY TO BLOOD LOSS (CHRONIC): ICD-10-CM

## 2021-04-08 DIAGNOSIS — Z98.890 OTHER SPECIFIED POSTPROCEDURAL STATES: Chronic | ICD-10-CM

## 2021-04-08 LAB
ALBUMIN SERPL ELPH-MCNC: 4.1 G/DL
ALP BLD-CCNC: 104 U/L
ALT SERPL-CCNC: 16 U/L
ANION GAP SERPL CALC-SCNC: 14 MMOL/L
AST SERPL-CCNC: 18 U/L
BILIRUB SERPL-MCNC: 0.4 MG/DL
BUN SERPL-MCNC: 18 MG/DL
CALCIUM SERPL-MCNC: 9 MG/DL
CHLORIDE SERPL-SCNC: 103 MMOL/L
CO2 SERPL-SCNC: 20 MMOL/L
CREAT SERPL-MCNC: 1.2 MG/DL
GLUCOSE SERPL-MCNC: 109 MG/DL
HCT VFR BLD CALC: 34.2 %
HGB BLD-MCNC: 11.1 G/DL
IRON SATN MFR SERPL: 12 %
IRON SERPL-MCNC: 43 UG/DL
MAGNESIUM SERPL-MCNC: 2.2 MG/DL
MCHC RBC-ENTMCNC: 30.9 PG
MCHC RBC-ENTMCNC: 32.5 G/DL
MCV RBC AUTO: 95.3 FL
PLATELET # BLD AUTO: 301 K/UL
PMV BLD: 9.6 FL
POTASSIUM SERPL-SCNC: 4.7 MMOL/L
PROT SERPL-MCNC: 7.1 G/DL
RBC # BLD: 3.59 M/UL
RBC # FLD: 13.1 %
RETICS # AUTO: 2.4 %
RETICS AGGREG/RBC NFR: 86.2 K/UL
SODIUM SERPL-SCNC: 137 MMOL/L
TIBC SERPL-MCNC: 349 UG/DL
UIBC SERPL-MCNC: 306 UG/DL
WBC # FLD AUTO: 6.32 K/UL

## 2021-04-08 PROCEDURE — 99204 OFFICE O/P NEW MOD 45 MIN: CPT

## 2021-04-08 RX ORDER — LOSARTAN POTASSIUM 100 MG/1
100 TABLET, FILM COATED ORAL
Refills: 0 | Status: ACTIVE | COMMUNITY

## 2021-04-08 RX ORDER — ALPRAZOLAM 1 MG/1
1 TABLET ORAL
Refills: 0 | Status: ACTIVE | COMMUNITY

## 2021-04-08 RX ORDER — AMLODIPINE BESYLATE 10 MG/1
10 TABLET ORAL
Refills: 0 | Status: ACTIVE | COMMUNITY

## 2021-04-08 RX ORDER — CLONIDINE HYDROCHLORIDE 0.1 MG/1
0.1 TABLET ORAL
Refills: 0 | Status: ACTIVE | COMMUNITY

## 2021-04-08 RX ORDER — CLONAZEPAM 2 MG/1
TABLET ORAL
Refills: 0 | Status: COMPLETED | COMMUNITY

## 2021-04-08 RX ORDER — AMLODIPINE BESYLATE 5 MG/1
5 TABLET ORAL
Refills: 0 | Status: ACTIVE | COMMUNITY

## 2021-04-08 RX ORDER — FLUOXETINE HYDROCHLORIDE 20 MG/1
20 CAPSULE ORAL
Refills: 0 | Status: ACTIVE | COMMUNITY

## 2021-04-08 NOTE — ASSESSMENT
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 on imaging\par -we don’t have a protocol open in [facility], off protocol \par -we went over options including role of neoadjuvant chemotherapy followed by surgery and possibly followed by a clinical trial with adjuvant immunotherapy   \par -we also briefly spoke about bladder sparing approach but he has hydronephrosis, unless they resolved this would not be ideal\par -I expleidn that the benefit of neoadjuvant therapy is about  5-7~ in survival and DFS\par -we went over opotions such as MVAC of Lynn CIs and data  for PFS and OS is still pending  from   phase III trial (GETUG/AFU V05 VESPER) but pCR are similar   (42 versus 36 percent). and since he is over 70 recommended Lynn Cis in D1, D8 every 21 days for 4 cycles \par -we went over toxicity in detail including  but not limiting to fatigue, cytopenias, need for transfusion,  worsening   of his neuropathy,  hearing loss, infection, renal failure, pneumonitis, diarrhea, edema  and very rarely  death. Writtein info provided\par -we went over need for hydration and anti emetic support \par -will check CT chest with IV contrast to complete staging\par -will check CMP again today to make sure GFR is >40 ideally greater than 60\par \par #Anemai is due to blood loss from hematuria\par -will check CBC and iron studies, we went over options to treat and will call them with resutls\par \par They will let me know  how they wish to proceed after their eval in [facility]   [Curative] : Goals of care discussed with patient: Curative

## 2021-04-08 NOTE — PHYSICAL EXAM
[Restricted in physically strenuous activity but ambulatory and able to carry out work of a light or sedentary nature] : Status 1- Restricted in physically strenuous activity but ambulatory and able to carry out work of a light or sedentary nature, e.g., light house work, office work [Obese] : obese [Normal] : affect appropriate [de-identified] : Very mild non potting edema in lower extermities

## 2021-04-08 NOTE — REASON FOR VISIT
[Initial Consultation] : an initial consultation [Family Member] : family member [FreeTextEntry2] : Bladder Cancer

## 2021-04-08 NOTE — REVIEW OF SYSTEMS
[Joint Pain] : joint pain [Difficulty Walking] : no difficulty walking [Negative] : Allergic/Immunologic

## 2021-04-08 NOTE — HISTORY OF PRESENT ILLNESS
[Disease: _____________________] : Disease: [unfilled] [T: ___] : T[unfilled] [N: ___] : N[unfilled] [M: ___] : M[unfilled] [AJCC Stage: ____] : AJCC Stage: [unfilled] [de-identified] : CC I have bladder cancer\par \par He is here at the request of Dr. 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 As you know he presented to [facility] last month due to hematuria with blod clots. He needed 2 units of PRBC for his anemia. You performed a TURBT that showed muscle invasive blader cancer and he is here to speak about neoadjuvant therapy. He is here with his son. They have a second opinion pending in [facility].\par \par His bleeding stopped post TURB and he kody much better after blood  transfusion.  He is back to his baseline and is active. 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.\par \par In regards to his Neuroptnhy its chronic several years, in his toes, mainly numbness does not interfere with ADLs \par \par \par Work up:\par 3/15/21: CT abdomen and Pelvis:   with IV contrast IMPRESSION:\par \par Irregular high density material within the bladder measuring 8.5 x 8.0 cm, likely representing hematoma and/or neoplasm, incompletely evaluated on this noncontrast examination.\par \par Air within the bladder may be related to instrumentation of Small catheter placement\par \par Moderate right hydroureteronephrosis and mild left hydroureteronephrosis to the level the bladder.\par \par Distal esophageal wall thickening and outpatient direct visualization is recommended\par \par \par \par Cystoscopy:  Cystourethroscopy with bladder tumor resection, clot evacuation:  Operative Findings bladder cancer - extensively involving the bladder neck\par region\par may represent extension from the prostate\par \par Path: Final Diagnosis\par Bladder tumor, transurethral resection:\par - 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 GFR was 42 on 3/13/21 LFTS with ALk  phos normal.  [de-identified] :  High grade papillary urothelial carcinoma

## 2021-04-08 NOTE — CONSULT LETTER
[Dear  ___] : Dear  [unfilled], [Consult Letter:] : I had the pleasure of evaluating your patient, [unfilled]. [Please see my note below.] : Please see my note below. [Consult Closing:] : Thank you very much for allowing me to participate in the care of this patient.  If you have any questions, please do not hesitate to contact me. [Sincerely,] : Sincerely, [FreeTextEntry3] : Otsi

## 2021-04-13 DIAGNOSIS — D50.0 IRON DEFICIENCY ANEMIA SECONDARY TO BLOOD LOSS (CHRONIC): ICD-10-CM

## 2021-04-13 DIAGNOSIS — C67.9 MALIGNANT NEOPLASM OF BLADDER, UNSPECIFIED: ICD-10-CM

## 2021-04-15 LAB — FERRITIN SERPL-MCNC: 51 NG/ML

## 2021-04-23 ENCOUNTER — RESULT REVIEW (OUTPATIENT)
Age: 71
End: 2021-04-23

## 2021-04-23 ENCOUNTER — OUTPATIENT (OUTPATIENT)
Dept: OUTPATIENT SERVICES | Facility: HOSPITAL | Age: 71
LOS: 1 days | Discharge: HOME | End: 2021-04-23
Payer: MEDICARE

## 2021-04-23 DIAGNOSIS — H26.9 UNSPECIFIED CATARACT: Chronic | ICD-10-CM

## 2021-04-23 DIAGNOSIS — C67.9 MALIGNANT NEOPLASM OF BLADDER, UNSPECIFIED: ICD-10-CM

## 2021-04-23 DIAGNOSIS — Z98.890 OTHER SPECIFIED POSTPROCEDURAL STATES: Chronic | ICD-10-CM

## 2021-04-23 PROCEDURE — 71260 CT THORAX DX C+: CPT | Mod: 26,MH

## 2021-05-10 NOTE — PROVIDER CONTACT NOTE (OTHER) - REASON
How Severe Is Your Dry Skin?: mild
Additional History: PT IS USING AQUAPHOR
Unable to irrigate Small
large amount of clots during brannon irrigation

## 2021-05-17 ENCOUNTER — APPOINTMENT (OUTPATIENT)
Dept: UROLOGY | Facility: CLINIC | Age: 71
End: 2021-05-17
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
## 2022-02-28 NOTE — PATIENT PROFILE ADULT - VISION (WITH CORRECTIVE LENSES IF THE PATIENT USUALLY WEARS THEM):
Partially impaired: cannot see medication labels or newsprint, but can see obstacles in path, and the surrounding layout; can count fingers at arm's length <-- Click to add NO significant Past Surgical History

## 2022-08-17 NOTE — ED ADULT TRIAGE NOTE - TEMPERATURE IN CELSIUS (DEGREES C)
08-15 Na134 mmol/L<L> Glu 111 mg/dL<H> K+ 4.7 mmol/L Cr  0.20 mg/dL BUN 12 mg/dL Phos n/a   Alb 3.9 g/dL PAB n/a
37.1

## 2023-11-10 NOTE — PRE-OP CHECKLIST - PATIENT SENT TO
CM Note:  Pt continues on 2L [address] [facility]  [phone] to provide home O2  Pt needs scrip for OP PT  Cardiology following -plan for limited ECHO on Monday  Pt to transport herself home at d/c  [facility] Sweeney operating room

## 2024-01-29 NOTE — BRIEF OPERATIVE NOTE - PRIMARY SURGEON
Bhavana Lopez MD Spray Paint Technique: No Detail Level: Detailed Show Spray Paint Technique Variable?: Yes Post-Care Instructions: I reviewed with the patient in detail post-care instructions. Patient is to wear sunprotection, and avoid picking at any of the treated lesions. Pt may apply Vaseline to crusted or scabbing areas. Duration Of Freeze Thaw-Cycle (Seconds): 5-10 Spray Paint Text: The liquid nitrogen was applied to the skin utilizing a spray paint frosting technique. Consent: The patient's consent was obtained including but not limited to risks of crusting, scabbing, blistering, scarring, darker or lighter pigmentary change, recurrence, incomplete removal and infection. Medical Necessity Information: It is in your best interest to select a reason for this procedure from the list below. All of these items fulfill various CMS LCD requirements except the new and changing color options. Medical Necessity Clause: This procedure was medically necessary because the lesions that were treated were: Number Of Freeze-Thaw Cycles: 1 freeze-thaw cycle Application Tool (Optional): Liquid Nitrogen Sprayer Duration Of Freeze Thaw-Cycle (Seconds): 2